# Patient Record
Sex: FEMALE | Race: ASIAN | NOT HISPANIC OR LATINO
[De-identification: names, ages, dates, MRNs, and addresses within clinical notes are randomized per-mention and may not be internally consistent; named-entity substitution may affect disease eponyms.]

---

## 2017-04-25 ENCOUNTER — APPOINTMENT (OUTPATIENT)
Dept: NEUROSURGERY | Facility: CLINIC | Age: 62
End: 2017-04-25

## 2017-04-25 VITALS
HEART RATE: 81 BPM | SYSTOLIC BLOOD PRESSURE: 114 MMHG | HEIGHT: 62 IN | BODY MASS INDEX: 21.16 KG/M2 | WEIGHT: 115 LBS | DIASTOLIC BLOOD PRESSURE: 79 MMHG

## 2017-05-02 ENCOUNTER — OTHER (OUTPATIENT)
Age: 62
End: 2017-05-02

## 2017-05-09 ENCOUNTER — APPOINTMENT (OUTPATIENT)
Dept: OBGYN | Facility: CLINIC | Age: 62
End: 2017-05-09

## 2017-05-22 ENCOUNTER — APPOINTMENT (OUTPATIENT)
Dept: OBGYN | Facility: CLINIC | Age: 62
End: 2017-05-22

## 2017-06-02 ENCOUNTER — APPOINTMENT (OUTPATIENT)
Dept: OBGYN | Facility: CLINIC | Age: 62
End: 2017-06-02

## 2017-06-05 ENCOUNTER — MESSAGE (OUTPATIENT)
Age: 62
End: 2017-06-05

## 2017-06-06 ENCOUNTER — APPOINTMENT (OUTPATIENT)
Dept: NEUROSURGERY | Facility: CLINIC | Age: 62
End: 2017-06-06

## 2017-06-06 VITALS
HEIGHT: 62 IN | WEIGHT: 104 LBS | BODY MASS INDEX: 19.14 KG/M2 | SYSTOLIC BLOOD PRESSURE: 111 MMHG | DIASTOLIC BLOOD PRESSURE: 74 MMHG | HEART RATE: 70 BPM

## 2017-06-06 DIAGNOSIS — M54.5 LOW BACK PAIN: ICD-10-CM

## 2017-06-06 DIAGNOSIS — M54.16 RADICULOPATHY, LUMBAR REGION: ICD-10-CM

## 2017-06-06 DIAGNOSIS — M51.26 OTHER INTERVERTEBRAL DISC DISPLACEMENT, LUMBAR REGION: ICD-10-CM

## 2017-06-22 ENCOUNTER — APPOINTMENT (OUTPATIENT)
Dept: NEUROSURGERY | Facility: CLINIC | Age: 62
End: 2017-06-22

## 2017-11-02 ENCOUNTER — APPOINTMENT (OUTPATIENT)
Dept: CARDIOLOGY | Facility: CLINIC | Age: 62
End: 2017-11-02

## 2017-11-02 ENCOUNTER — APPOINTMENT (OUTPATIENT)
Dept: OPHTHALMOLOGY | Facility: CLINIC | Age: 62
End: 2017-11-02

## 2017-11-28 ENCOUNTER — INPATIENT (INPATIENT)
Facility: HOSPITAL | Age: 62
LOS: 0 days | Discharge: HOME | DRG: 312 | End: 2017-11-29
Attending: INTERNAL MEDICINE | Admitting: INTERNAL MEDICINE
Payer: COMMERCIAL

## 2017-11-28 VITALS — RESPIRATION RATE: 20 BRPM | HEART RATE: 70 BPM | DIASTOLIC BLOOD PRESSURE: 90 MMHG | SYSTOLIC BLOOD PRESSURE: 150 MMHG
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
## 2017-11-28 DIAGNOSIS — Z90.49 ACQUIRED ABSENCE OF OTHER SPECIFIED PARTS OF DIGESTIVE TRACT: Chronic | ICD-10-CM

## 2017-11-28 LAB
ALBUMIN SERPL ELPH-MCNC: 4.3 G/DL — SIGNIFICANT CHANGE UP (ref 3.3–5)
ALP SERPL-CCNC: 54 U/L — SIGNIFICANT CHANGE UP (ref 40–120)
ALT FLD-CCNC: 28 U/L RC — SIGNIFICANT CHANGE UP (ref 10–45)
ANION GAP SERPL CALC-SCNC: 19 MMOL/L — HIGH (ref 5–17)
AST SERPL-CCNC: 23 U/L — SIGNIFICANT CHANGE UP (ref 10–40)
BASE EXCESS BLDV CALC-SCNC: 1.6 MMOL/L — SIGNIFICANT CHANGE UP (ref -2–2)
BASOPHILS # BLD AUTO: 0.1 K/UL — SIGNIFICANT CHANGE UP (ref 0–0.2)
BASOPHILS NFR BLD AUTO: 1 % — SIGNIFICANT CHANGE UP (ref 0–2)
BILIRUB SERPL-MCNC: 0.5 MG/DL — SIGNIFICANT CHANGE UP (ref 0.2–1.2)
BUN SERPL-MCNC: 13 MG/DL — SIGNIFICANT CHANGE UP (ref 7–23)
CA-I SERPL-SCNC: 1.23 MMOL/L — SIGNIFICANT CHANGE UP (ref 1.12–1.3)
CALCIUM SERPL-MCNC: 10 MG/DL — SIGNIFICANT CHANGE UP (ref 8.4–10.5)
CHLORIDE BLDV-SCNC: 105 MMOL/L — SIGNIFICANT CHANGE UP (ref 96–108)
CHLORIDE SERPL-SCNC: 100 MMOL/L — SIGNIFICANT CHANGE UP (ref 96–108)
CK MB CFR SERPL CALC: 1.2 NG/ML — SIGNIFICANT CHANGE UP (ref 0–3.8)
CO2 BLDV-SCNC: 27 MMOL/L — SIGNIFICANT CHANGE UP (ref 22–30)
CO2 SERPL-SCNC: 22 MMOL/L — SIGNIFICANT CHANGE UP (ref 22–31)
CREAT SERPL-MCNC: 0.57 MG/DL — SIGNIFICANT CHANGE UP (ref 0.5–1.3)
EOSINOPHIL # BLD AUTO: 0.2 K/UL — SIGNIFICANT CHANGE UP (ref 0–0.5)
EOSINOPHIL NFR BLD AUTO: 2.7 % — SIGNIFICANT CHANGE UP (ref 0–6)
GAS PNL BLDV: 139 MMOL/L — SIGNIFICANT CHANGE UP (ref 136–145)
GAS PNL BLDV: SIGNIFICANT CHANGE UP
GAS PNL BLDV: SIGNIFICANT CHANGE UP
GLUCOSE BLDV-MCNC: 122 MG/DL — HIGH (ref 70–99)
GLUCOSE SERPL-MCNC: 130 MG/DL — HIGH (ref 70–99)
HCO3 BLDV-SCNC: 26 MMOL/L — SIGNIFICANT CHANGE UP (ref 21–29)
HCT VFR BLD CALC: 42.9 % — SIGNIFICANT CHANGE UP (ref 34.5–45)
HCT VFR BLDA CALC: 42 % — SIGNIFICANT CHANGE UP (ref 39–50)
HGB BLD CALC-MCNC: 13.7 G/DL — SIGNIFICANT CHANGE UP (ref 11.5–15.5)
HGB BLD-MCNC: 14 G/DL — SIGNIFICANT CHANGE UP (ref 11.5–15.5)
LACTATE BLDV-MCNC: 5.1 MMOL/L — CRITICAL HIGH (ref 0.7–2)
LYMPHOCYTES # BLD AUTO: 3.4 K/UL — HIGH (ref 1–3.3)
LYMPHOCYTES # BLD AUTO: 42.9 % — SIGNIFICANT CHANGE UP (ref 13–44)
MCHC RBC-ENTMCNC: 29.5 PG — SIGNIFICANT CHANGE UP (ref 27–34)
MCHC RBC-ENTMCNC: 32.6 GM/DL — SIGNIFICANT CHANGE UP (ref 32–36)
MCV RBC AUTO: 90.4 FL — SIGNIFICANT CHANGE UP (ref 80–100)
MONOCYTES # BLD AUTO: 0.7 K/UL — SIGNIFICANT CHANGE UP (ref 0–0.9)
MONOCYTES NFR BLD AUTO: 8.2 % — SIGNIFICANT CHANGE UP (ref 2–14)
NEUTROPHILS # BLD AUTO: 3.6 K/UL — SIGNIFICANT CHANGE UP (ref 1.8–7.4)
NEUTROPHILS NFR BLD AUTO: 45.2 % — SIGNIFICANT CHANGE UP (ref 43–77)
PCO2 BLDV: 40 MMHG — SIGNIFICANT CHANGE UP (ref 35–50)
PH BLDV: 7.42 — SIGNIFICANT CHANGE UP (ref 7.35–7.45)
PLATELET # BLD AUTO: 341 K/UL — SIGNIFICANT CHANGE UP (ref 150–400)
PO2 BLDV: 29 MMHG — SIGNIFICANT CHANGE UP (ref 25–45)
POTASSIUM BLDV-SCNC: 4.4 MMOL/L — SIGNIFICANT CHANGE UP (ref 3.5–5)
POTASSIUM SERPL-MCNC: 5 MMOL/L — SIGNIFICANT CHANGE UP (ref 3.5–5.3)
POTASSIUM SERPL-SCNC: 5 MMOL/L — SIGNIFICANT CHANGE UP (ref 3.5–5.3)
PROT SERPL-MCNC: 6.7 G/DL — SIGNIFICANT CHANGE UP (ref 6–8.3)
RBC # BLD: 4.75 M/UL — SIGNIFICANT CHANGE UP (ref 3.8–5.2)
RBC # FLD: 11.8 % — SIGNIFICANT CHANGE UP (ref 10.3–14.5)
SAO2 % BLDV: 52 % — LOW (ref 67–88)
SODIUM SERPL-SCNC: 141 MMOL/L — SIGNIFICANT CHANGE UP (ref 135–145)
TROPONIN T SERPL-MCNC: <0.01 NG/ML — SIGNIFICANT CHANGE UP (ref 0–0.06)
TROPONIN T SERPL-MCNC: <0.01 NG/ML — SIGNIFICANT CHANGE UP (ref 0–0.06)
WBC # BLD: 8 K/UL — SIGNIFICANT CHANGE UP (ref 3.8–10.5)
WBC # FLD AUTO: 8 K/UL — SIGNIFICANT CHANGE UP (ref 3.8–10.5)

## 2017-11-28 PROCEDURE — 99285 EMERGENCY DEPT VISIT HI MDM: CPT

## 2017-11-28 PROCEDURE — 70450 CT HEAD/BRAIN W/O DYE: CPT | Mod: 26

## 2017-11-28 PROCEDURE — 71020: CPT | Mod: 26

## 2017-11-28 RX ORDER — SODIUM CHLORIDE 9 MG/ML
1000 INJECTION INTRAMUSCULAR; INTRAVENOUS; SUBCUTANEOUS
Qty: 0 | Refills: 0 | Status: DISCONTINUED | OUTPATIENT
Start: 2017-11-28 | End: 2017-11-29

## 2017-11-28 RX ORDER — SODIUM CHLORIDE 9 MG/ML
3 INJECTION INTRAMUSCULAR; INTRAVENOUS; SUBCUTANEOUS ONCE
Qty: 0 | Refills: 0 | Status: COMPLETED | OUTPATIENT
Start: 2017-11-28 | End: 2017-11-28

## 2017-11-28 RX ADMIN — SODIUM CHLORIDE 125 MILLILITER(S): 9 INJECTION INTRAMUSCULAR; INTRAVENOUS; SUBCUTANEOUS at 14:59

## 2017-11-28 RX ADMIN — SODIUM CHLORIDE 3 MILLILITER(S): 9 INJECTION INTRAMUSCULAR; INTRAVENOUS; SUBCUTANEOUS at 14:50

## 2017-11-28 NOTE — ED PROVIDER NOTE - PHYSICAL EXAMINATION
GENERAL: THIN, awake, alert, oriented to person, place, time/situation and in NAD  ENMT: DRY MM, Airway patent, Nasal mucosa clear. Throat has no vesicles, no oropharyngeal exudates and uvula is midline.  EYES: Clear bilaterally, PERRL  CARDIAC: Regular rate, regular rhythm.  Heart sounds S1, S2, no S3, S4. No murmurs, rubs or gallops. no pedal edema bilat  RESPIRATORY: Breath sounds clear and equal in bilateral anterior lung fields, no wheezes/ronchi/stridor; pt breathing and speaking comfortably with no increased WOB, no accessory mm. use, no intercostal retractions, no nasal flaring  GI: no ecchymosis, erythema, or lacerations, abdomen soft, non-distended, non-tender, no rebound or guarding.   NEURO: pupils 3 mm, PERRL, facial sensation intact to light touch in all 3 divisions bilat (CN V), face is symmetric with normal eye closure, eye opening, and smile (CN VII), hearing is normal to rubbing fingers (CN VII), phonation is normal (CN IX, X),  shoulder shrug intact bilat (CN XI), tongue is midline with nl movements and no atrophy (CN XII), finger to nose test nl bilat, negative pronator drift bilat, negative Romberg, speech is clear; 5/5 motor strength BUE and BLE: deltoids, biceps, triceps, wrist flexors/extensors, hand , hip flexors, knee flexors/extensors, plantar/dorsiflexors, hallux flexors/extensors; sensation intact to light touch BUE and BLE: C5-T1 and L3-S1

## 2017-11-28 NOTE — ED ADULT NURSE NOTE - ED STAT RN HANDOFF DETAILS
hand off given to oncoming RN Isaura Vargas. Pt awaiting CT. A&Ox3. Head pressure almost resolved. Fall risk precautions maintained. son at stretcherside. Yellow band intact. Red socks applied. siderails up

## 2017-11-28 NOTE — ED PROVIDER NOTE - ATTENDING CONTRIBUTION TO CARE
Private Physician Dr BARRY fierro pcp /not staff  62y female pmh Osteoporosis, DMT2, HTN,HLD, Sp erich 7/2017, Allergy pcn/ciprp/sulfa. Pt comes to ed complains of was cooking and carrying a pot felt dizzy and fell and with head pressure. With nausea vomiting, and a weird feeling thoughtout her body. Onset at home. PT under stress. Marital infedelity. PE WDWN looking stated age NCAT chest  clear anterior & posterior abd soft +bs neuro no focal defects power 5/5 all extr pain light touch intact.  Tom Shahid MD, Facep

## 2017-11-28 NOTE — ED PROVIDER NOTE - MEDICAL DECISION MAKING DETAILS
61 yo Estonian speaking F c PMH of HTN, HLD, and DM presenting s/p fall after hearing "bad news". No hx of sx. Pt denies head trauma or LOC. On exam, /90 VS otherwise WNL. On exam, no focal neuro deficits, pt appears thin, dry MM. Likely vasovagal syncope vs orthostatic. IVF, CTH, CXR, EKG, and labs pending. will reassess.

## 2017-11-28 NOTE — H&P ADULT - NSHPPHYSICALEXAM_GEN_ALL_CORE
Vital Signs Last 24 Hrs  T(C): 36.8 (28 Nov 2017 20:52), Max: 36.9 (28 Nov 2017 19:43)  T(F): 98.3 (28 Nov 2017 20:52), Max: 98.4 (28 Nov 2017 19:43)  HR: 75 (28 Nov 2017 20:52) (68 - 82)  BP: 119/80 (28 Nov 2017 20:52) (115/65 - 150/90)  BP(mean): --  RR: 16 (28 Nov 2017 20:52) (16 - 20)  SpO2: 98% (28 Nov 2017 20:52) (98% - 100%)    PHYSICAL EXAM:  GENERAL: NAD, well-developed  HEAD:  Atraumatic, Normocephalic  EYES: EOMI, PERRLA, conjunctiva and sclera clear  NECK: Supple, No JVD  CHEST/LUNG: Clear to auscultation bilaterally; No wheeze  HEART: Regular rate and rhythm; No murmurs, rubs, or gallops  ABDOMEN: Soft, Nontender, Nondistended; Bowel sounds present  EXTREMITIES:  2+ Peripheral Pulses, No clubbing, cyanosis, or edema  PSYCH: AAOx3  NEUROLOGY: non-focal, moves all ext  SKIN: No rashes or lesions

## 2017-11-28 NOTE — ED ADULT NURSE NOTE - OBJECTIVE STATEMENT
[id number] 62 yr old female brought to ER via ambulance on stretcher for further eval and tx of head pressure/pain. "Under a lot of stress recently"Has been anxious and crying x 3 days. denies SI/HI. Interpretation through son. denies dizziness, palp, chest pain, SOB, fever, chills or dysuria. Tripped and feel today while carrying a tray of food. no LOC

## 2017-11-28 NOTE — H&P ADULT - HISTORY OF PRESENT ILLNESS
pt seen and examined on 11/28/17    63 yo F c PMH of HTN, HLD, and DM presenting s/p fall. Pt reports she heard upsetting news about her , then was carrying a pot in her kitchen, then fell to the ground, she does not know why she fell. Pt denies head trauma or LOC. Pt denies preceding dizziness or lightheadedness, states after fall she felt generalized weakness. Pt denies hx of sx. Pt states she is eating and drinking normally.     	ROS positive: fall, generalized weakness  ROS negative: f/c, CP, SOB, n/v, abdominal pain, hemoptysis, hematachezia, visual changes    pt doesn't know home meds

## 2017-11-28 NOTE — ED PROVIDER NOTE - PROGRESS NOTE DETAILS
Endorsed to Dr Jaqui Shahid MD, Facep pt states she feels better s/p IVF. pt states she feels better s/p IVF. I discussed with patient that we would like her to stay in the ED overnight for monitoring of her heart she states she would like to defer this decision to her son and daughter. spoke to son in person and daughter over the phone. daughter would like to wait to make a decision until pending labs are resulted. will reassess lactate improving, pt and family amenable to admission to hospital, called hospitalist [phone], awaiting callback

## 2017-11-28 NOTE — H&P ADULT - NSHPREVIEWOFSYSTEMS_GEN_ALL_CORE
Constitutional: No fever or weight loss.  Skin: No rash.  Eyes: No recent vision problems or eye pain.  ENT: No congestion, ear pain, or sore throat.  Endocrine: No thyroid problems.  Cardiovascular: No chest pain or palpation.  Respiratory: No cough, shortness of breath, congestion, or wheezing.  Gastrointestinal: No abdominal pain, nausea, vomiting, or diarrhea.  Genitourinary: No dysuria.  Musculoskeletal: No joint swelling.  Neurologic: No headache.

## 2017-11-28 NOTE — ED PROVIDER NOTE - OBJECTIVE STATEMENT
ID [id number] Pt gives permission for her son to intepret from Irish to English, declines     61 yo Irish speaking F c PMH of HTN, HLD, and DM presenting s/p fall. Pt reports she heard upsetting news about her , then was carrying a pot in her kitchen, then fell to the ground, she does not know why she fell. Pt denies head trauma or LOC. Pt denies preceding dizziness or lightheadedness, states after fall she felt generalized weakness. Pt denies hx of sx. Pt states she is eating and drinking normally.     ROS positive: fall, generalized weakness  ROS negative: f/c, CP, SOB, n/v, abdominal pain, hemoptysis, hematachezia, visual changes

## 2017-11-28 NOTE — H&P ADULT - ASSESSMENT
62 female h/o dm, htn, chol, a/w syncope/fall    syncope  cards consult  echo  orthostatics  tele  trend CE      DM  iss    clarify home meds in am    dvt ppx

## 2017-11-28 NOTE — ED ADULT NURSE REASSESSMENT NOTE - NS ED NURSE REASSESS COMMENT FT1
Report taken from Portia PEREZ in red. Pt resting in stretcher with family at bedside currently being examined by MD. Pt noted to be able to stand and ambulate with steady gait. Pt moved into room to have EKG and cardiac monitor applied. IV fluids infusing as ordered. Labs sent.

## 2017-11-28 NOTE — ED ADULT NURSE REASSESSMENT NOTE - NS ED NURSE REASSESS COMMENT FT1
Pt resting in stretcher with family at bedside in NAD and VSS. Repeat VBG drawn as per orders. Md at bedside speaking to pt and family.

## 2017-11-29 ENCOUNTER — TRANSCRIPTION ENCOUNTER (OUTPATIENT)
Age: 62
End: 2017-11-29

## 2017-11-29 VITALS — SYSTOLIC BLOOD PRESSURE: 117 MMHG | DIASTOLIC BLOOD PRESSURE: 76 MMHG | HEART RATE: 76 BPM

## 2017-11-29 LAB
ANION GAP SERPL CALC-SCNC: 12 MMOL/L — SIGNIFICANT CHANGE UP (ref 5–17)
BUN SERPL-MCNC: 10 MG/DL — SIGNIFICANT CHANGE UP (ref 7–23)
CALCIUM SERPL-MCNC: 8.4 MG/DL — SIGNIFICANT CHANGE UP (ref 8.4–10.5)
CHLORIDE SERPL-SCNC: 107 MMOL/L — SIGNIFICANT CHANGE UP (ref 96–108)
CK SERPL-CCNC: 33 U/L — SIGNIFICANT CHANGE UP (ref 25–170)
CO2 SERPL-SCNC: 23 MMOL/L — SIGNIFICANT CHANGE UP (ref 22–31)
CREAT SERPL-MCNC: 0.48 MG/DL — LOW (ref 0.5–1.3)
GLUCOSE BLDC GLUCOMTR-MCNC: 109 MG/DL — HIGH (ref 70–99)
GLUCOSE BLDC GLUCOMTR-MCNC: 172 MG/DL — HIGH (ref 70–99)
GLUCOSE BLDC GLUCOMTR-MCNC: 216 MG/DL — HIGH (ref 70–99)
GLUCOSE SERPL-MCNC: 215 MG/DL — HIGH (ref 70–99)
HBA1C BLD-MCNC: 6.9 % — HIGH (ref 4–5.6)
HCT VFR BLD CALC: 34.5 % — SIGNIFICANT CHANGE UP (ref 34.5–45)
HGB BLD-MCNC: 11.8 G/DL — SIGNIFICANT CHANGE UP (ref 11.5–15.5)
MCHC RBC-ENTMCNC: 30.8 PG — SIGNIFICANT CHANGE UP (ref 27–34)
MCHC RBC-ENTMCNC: 34.1 GM/DL — SIGNIFICANT CHANGE UP (ref 32–36)
MCV RBC AUTO: 90.3 FL — SIGNIFICANT CHANGE UP (ref 80–100)
PLATELET # BLD AUTO: 278 K/UL — SIGNIFICANT CHANGE UP (ref 150–400)
POTASSIUM SERPL-MCNC: 3.6 MMOL/L — SIGNIFICANT CHANGE UP (ref 3.5–5.3)
POTASSIUM SERPL-SCNC: 3.6 MMOL/L — SIGNIFICANT CHANGE UP (ref 3.5–5.3)
RBC # BLD: 3.82 M/UL — SIGNIFICANT CHANGE UP (ref 3.8–5.2)
RBC # FLD: 11.9 % — SIGNIFICANT CHANGE UP (ref 10.3–14.5)
SODIUM SERPL-SCNC: 142 MMOL/L — SIGNIFICANT CHANGE UP (ref 135–145)
TROPONIN T SERPL-MCNC: <0.01 NG/ML — SIGNIFICANT CHANGE UP (ref 0–0.06)
WBC # BLD: 6.4 K/UL — SIGNIFICANT CHANGE UP (ref 3.8–10.5)
WBC # FLD AUTO: 6.4 K/UL — SIGNIFICANT CHANGE UP (ref 3.8–10.5)

## 2017-11-29 PROCEDURE — 84132 ASSAY OF SERUM POTASSIUM: CPT

## 2017-11-29 PROCEDURE — 84484 ASSAY OF TROPONIN QUANT: CPT

## 2017-11-29 PROCEDURE — 83036 HEMOGLOBIN GLYCOSYLATED A1C: CPT

## 2017-11-29 PROCEDURE — 83605 ASSAY OF LACTIC ACID: CPT

## 2017-11-29 PROCEDURE — 82435 ASSAY OF BLOOD CHLORIDE: CPT

## 2017-11-29 PROCEDURE — 82803 BLOOD GASES ANY COMBINATION: CPT

## 2017-11-29 PROCEDURE — 82565 ASSAY OF CREATININE: CPT

## 2017-11-29 PROCEDURE — 70450 CT HEAD/BRAIN W/O DYE: CPT

## 2017-11-29 PROCEDURE — 82330 ASSAY OF CALCIUM: CPT

## 2017-11-29 PROCEDURE — 71046 X-RAY EXAM CHEST 2 VIEWS: CPT

## 2017-11-29 PROCEDURE — 80053 COMPREHEN METABOLIC PANEL: CPT

## 2017-11-29 PROCEDURE — 82550 ASSAY OF CK (CPK): CPT

## 2017-11-29 PROCEDURE — 99285 EMERGENCY DEPT VISIT HI MDM: CPT | Mod: 25

## 2017-11-29 PROCEDURE — 80048 BASIC METABOLIC PNL TOTAL CA: CPT

## 2017-11-29 PROCEDURE — 82553 CREATINE MB FRACTION: CPT

## 2017-11-29 PROCEDURE — 85014 HEMATOCRIT: CPT

## 2017-11-29 PROCEDURE — 84295 ASSAY OF SERUM SODIUM: CPT

## 2017-11-29 PROCEDURE — 82947 ASSAY GLUCOSE BLOOD QUANT: CPT

## 2017-11-29 PROCEDURE — 93306 TTE W/DOPPLER COMPLETE: CPT

## 2017-11-29 PROCEDURE — 82962 GLUCOSE BLOOD TEST: CPT

## 2017-11-29 PROCEDURE — 85027 COMPLETE CBC AUTOMATED: CPT

## 2017-11-29 PROCEDURE — 93306 TTE W/DOPPLER COMPLETE: CPT | Mod: 26

## 2017-11-29 RX ORDER — DEXTROSE 50 % IN WATER 50 %
25 SYRINGE (ML) INTRAVENOUS ONCE
Qty: 0 | Refills: 0 | Status: DISCONTINUED | OUTPATIENT
Start: 2017-11-29 | End: 2017-11-29

## 2017-11-29 RX ORDER — HEPARIN SODIUM 5000 [USP'U]/ML
5000 INJECTION INTRAVENOUS; SUBCUTANEOUS EVERY 8 HOURS
Qty: 0 | Refills: 0 | Status: DISCONTINUED | OUTPATIENT
Start: 2017-11-29 | End: 2017-11-29

## 2017-11-29 RX ORDER — ATORVASTATIN CALCIUM 80 MG/1
10 TABLET, FILM COATED ORAL AT BEDTIME
Qty: 0 | Refills: 0 | Status: DISCONTINUED | OUTPATIENT
Start: 2017-11-29 | End: 2017-11-29

## 2017-11-29 RX ORDER — DEXTROSE 50 % IN WATER 50 %
12.5 SYRINGE (ML) INTRAVENOUS ONCE
Qty: 0 | Refills: 0 | Status: DISCONTINUED | OUTPATIENT
Start: 2017-11-29 | End: 2017-11-29

## 2017-11-29 RX ORDER — SODIUM CHLORIDE 9 MG/ML
1000 INJECTION, SOLUTION INTRAVENOUS
Qty: 0 | Refills: 0 | Status: DISCONTINUED | OUTPATIENT
Start: 2017-11-29 | End: 2017-11-29

## 2017-11-29 RX ORDER — INSULIN LISPRO 100/ML
VIAL (ML) SUBCUTANEOUS
Qty: 0 | Refills: 0 | Status: DISCONTINUED | OUTPATIENT
Start: 2017-11-29 | End: 2017-11-29

## 2017-11-29 RX ORDER — LISINOPRIL 2.5 MG/1
1 TABLET ORAL
Qty: 0 | Refills: 0 | COMMUNITY

## 2017-11-29 RX ORDER — DOCUSATE SODIUM 100 MG
100 CAPSULE ORAL THREE TIMES A DAY
Qty: 0 | Refills: 0 | Status: DISCONTINUED | OUTPATIENT
Start: 2017-11-29 | End: 2017-11-29

## 2017-11-29 RX ORDER — DEXTROSE 50 % IN WATER 50 %
1 SYRINGE (ML) INTRAVENOUS ONCE
Qty: 0 | Refills: 0 | Status: DISCONTINUED | OUTPATIENT
Start: 2017-11-29 | End: 2017-11-29

## 2017-11-29 RX ORDER — GLUCAGON INJECTION, SOLUTION 0.5 MG/.1ML
1 INJECTION, SOLUTION SUBCUTANEOUS ONCE
Qty: 0 | Refills: 0 | Status: DISCONTINUED | OUTPATIENT
Start: 2017-11-29 | End: 2017-11-29

## 2017-11-29 RX ORDER — CARVEDILOL PHOSPHATE 80 MG/1
12.5 CAPSULE, EXTENDED RELEASE ORAL EVERY 12 HOURS
Qty: 0 | Refills: 0 | Status: DISCONTINUED | OUTPATIENT
Start: 2017-11-29 | End: 2017-11-29

## 2017-11-29 RX ORDER — ASPIRIN/CALCIUM CARB/MAGNESIUM 324 MG
81 TABLET ORAL DAILY
Qty: 0 | Refills: 0 | Status: DISCONTINUED | OUTPATIENT
Start: 2017-11-29 | End: 2017-11-29

## 2017-11-29 RX ORDER — LISINOPRIL 2.5 MG/1
20 TABLET ORAL DAILY
Qty: 0 | Refills: 0 | Status: DISCONTINUED | OUTPATIENT
Start: 2017-11-29 | End: 2017-11-29

## 2017-11-29 RX ORDER — DOCUSATE SODIUM 100 MG
1 CAPSULE ORAL
Qty: 0 | Refills: 0 | COMMUNITY
Start: 2017-11-29

## 2017-11-29 RX ORDER — PANTOPRAZOLE SODIUM 20 MG/1
40 TABLET, DELAYED RELEASE ORAL
Qty: 0 | Refills: 0 | Status: DISCONTINUED | OUTPATIENT
Start: 2017-11-29 | End: 2017-11-29

## 2017-11-29 RX ORDER — ASPIRIN/CALCIUM CARB/MAGNESIUM 324 MG
1 TABLET ORAL
Qty: 0 | Refills: 0 | COMMUNITY

## 2017-11-29 RX ADMIN — HEPARIN SODIUM 5000 UNIT(S): 5000 INJECTION INTRAVENOUS; SUBCUTANEOUS at 13:11

## 2017-11-29 RX ADMIN — Medication 81 MILLIGRAM(S): at 18:05

## 2017-11-29 RX ADMIN — CARVEDILOL PHOSPHATE 12.5 MILLIGRAM(S): 80 CAPSULE, EXTENDED RELEASE ORAL at 18:05

## 2017-11-29 RX ADMIN — Medication 1: at 17:58

## 2017-11-29 RX ADMIN — Medication 2: at 12:46

## 2017-11-29 RX ADMIN — HEPARIN SODIUM 5000 UNIT(S): 5000 INJECTION INTRAVENOUS; SUBCUTANEOUS at 05:09

## 2017-11-29 RX ADMIN — Medication 1 TABLET(S): at 18:05

## 2017-11-29 NOTE — PROGRESS NOTE ADULT - ASSESSMENT
62 female h/o dm, htn, chol, a/w syncope/fall    syncope  cards consult noted  echo noted  orthostatics nl  tele no ectopy  acs ruled out  likely vasovagal syncope      DM  resume home meds    dc planning  oupt f/u with pmd

## 2017-11-29 NOTE — CONSULT NOTE ADULT - SUBJECTIVE AND OBJECTIVE BOX
CHIEF COMPLAINT:    HPI:  63 yo F c PMH of HTN, HLD, and DM presenting s/p fall. Pt reports she heard upsetting news about her , then was carrying a pot in her kitchen, then fell to the ground, she does not know why she fell. Pt denies head trauma or LOC. Pt denies preceding chest pain, dyspnea, palpitations,dizziness or lightheadedness, states after fall she felt generalized weakness. Pt denies hx of sx. Pt states she is eating and drinking normally.     	    PAST MEDICAL & SURGICAL HISTORY:  High cholesterol  Hypertension  Diabetes  DM (diabetes mellitus)  History of cholecystectomy          PREVIOUS DIAGNOSTIC TESTING:    [ ] Echocardiogram:  [ ]  Catheterization:  [ ] Stress Test:  	    MEDICATIONS:  MEDICATIONS  (STANDING):  dextrose 5%. 1000 milliLiter(s) (50 mL/Hr) IV Continuous <Continuous>  dextrose 50% Injectable 12.5 Gram(s) IV Push once  dextrose 50% Injectable 25 Gram(s) IV Push once  dextrose 50% Injectable 25 Gram(s) IV Push once  heparin  Injectable 5000 Unit(s) SubCutaneous every 8 hours  insulin lispro (HumaLOG) corrective regimen sliding scale   SubCutaneous three times a day before meals  sodium chloride 0.9%. 1000 milliLiter(s) (125 mL/Hr) IV Continuous <Continuous>      FAMILY HISTORY:  No pertinent family history in first degree relatives      SOCIAL HISTORY:    [ ] Non-smoker  [ ] Smoker  [ ] Alcohol    Allergies    Cipro (Unknown)  pcn cipro sulfa (Unknown)  penicillin (Hives)  penicillin (Unknown)  sulfa drugs (Unknown)    Intolerances    	    REVIEW OF SYSTEMS:  CONSTITUTIONAL: No fever, weight loss, or fatigue  EYES: No eye pain, visual disturbances, or discharge  ENMT:  No difficulty hearing, tinnitus, vertigo; No sinus or throat pain  NECK: No pain or stiffness  RESPIRATORY: see HPI  CARDIOVASCULAR: No chest pain, palpitations, passing out, dizziness, or leg swelling  GASTROINTESTINAL: No abdominal or epigastric pain. No nausea, vomiting, or hematemesis; No diarrhea or constipation. No melena or hematochezia.  GENITOURINARY: No dysuria, frequency, hematuria, or incontinence  NEUROLOGICAL: No headaches, memory loss, loss of strength, numbness, or tremors  SKIN: No itching, burning, rashes, or lesions   	    [ ] All others negative	  [ ] Unable to obtain    PHYSICAL EXAM:  T(C): 36.8 (11-29-17 @ 05:19), Max: 36.9 (11-28-17 @ 19:43)  HR: 86 (11-29-17 @ 05:19) (68 - 86)  BP: 102/62 (11-29-17 @ 05:19) (102/62 - 150/90)  RR: 18 (11-29-17 @ 05:19) (16 - 20)  SpO2: 97% (11-29-17 @ 05:19) (97% - 100%)  Wt(kg): --  I&O's Summary    28 Nov 2017 07:01  -  29 Nov 2017 07:00  --------------------------------------------------------  IN: 240 mL / OUT: 0 mL / NET: 240 mL        Appearance: Normal	  Psychiatry: A & O x 3, Mood & affect appropriate  HEENT:   Normal oral mucosa, PERRL, EOMI	  Lymphatic: No lymphadenopathy  Cardiovascular: Normal S1 S2,RRR, No JVD, No murmurs  Respiratory: Lungs clear to auscultation	  Gastrointestinal:  Soft, Non-tender, + BS	  Skin: No rashes, No ecchymoses, No cyanosis	  Neurologic: Non-focal  Extremities: Normal range of motion, No clubbing, cyanosis or edema  Vascular: Peripheral pulses palpable 2+ bilaterally    TELEMETRY: 	  NSR  ECG:  	NSR, anteroseptal; TWI  RADIOLOGY:  OTHER: 	  	  LABS:	 	    CARDIAC MARKERS:                                  11.8   6.4   )-----------( 278      ( 29 Nov 2017 06:17 )             34.5     11-29    142  |  107  |  10  ----------------------------<  215<H>  3.6   |  23  |  0.48<L>    Ca    8.4      29 Nov 2017 06:17    TPro  6.7  /  Alb  4.3  /  TBili  0.5  /  DBili  x   /  AST  23  /  ALT  28  /  AlkPhos  54  11-28      proBNP:   Lipid Profile:   HgA1c: Hemoglobin A1C, Whole Blood: 6.9 % (11-29 @ 07:19)    TSH:     ASSESSMENT/PLAN:

## 2017-11-29 NOTE — DISCHARGE NOTE ADULT - MEDICATION SUMMARY - MEDICATIONS TO TAKE
I will START or STAY ON the medications listed below when I get home from the hospital:    meloxicam 15 mg oral tablet  -- 1 tab(s) by mouth once a day, As Needed  -- Indication: For Muscle spasm    aspirin 81 mg oral tablet  -- 1 tab(s) by mouth once a day  -- Indication: For Cardiac prophylaxis    lisinopril 20 mg oral tablet  -- 1 tab(s) by mouth once a day  -- Indication: For High BP    Tradjenta 5 mg oral tablet  -- 1 tab(s) by mouth once a day  -- Indication: For DM2    metFORMIN 1000 mg oral tablet  -- 1 tab(s) by mouth 2 times a day  -- Indication: For DM2    ondansetron 4 mg oral tablet  -- 1 tab(s) by mouth every 12 hours, As Needed  -- Indication: For Nausea/vomiting    pravastatin 40 mg oral tablet  -- 1 tab(s) by mouth once a day  -- Indication: For High cholesterol    carvedilol 12.5 mg oral tablet  -- 1 tab(s) by mouth 2 times a day  -- Indication: For High BP    docusate sodium 100 mg oral capsule  -- 1 cap(s) by mouth 2 times a day, As Needed  -- Indication: For constipation    NexIUM 20 mg oral delayed release capsule  -- 1 cap(s) by mouth once a day  -- Indication: For Reflux/Indigestion    Oyster Shell Calcium with Vitamin D 500 mg-200 intl units oral tablet  -- 1 tab(s) by mouth 2 times a day  -- Indication: For supplement

## 2017-11-29 NOTE — DISCHARGE NOTE ADULT - PATIENT PORTAL LINK FT
“You can access the [employer] Patient Portal, offered by [facility], by registering with the following website: [email or web address]”

## 2017-11-29 NOTE — DISCHARGE NOTE ADULT - CARE PLAN
Principal Discharge DX:	Fall, initial encounter  Goal:	Maintain safety  Instructions for follow-up, activity and diet:	No fractures found on CT scan of your head and neck  Cardiology work-up was negative for any abnormalities that may have contributed to your fall.  Follow up with your primary healthcare provider in 1-2 weeks.  Call for appointment.  Secondary Diagnosis:	DM (diabetes mellitus)  Instructions for follow-up, activity and diet:	HgA1C this admission 6.9  Make sure you get your HgA1c checked every three months.  If you take oral diabetes medications, check your blood glucose two times a day.  If you take insulin, check your blood glucose before meals and at bedtime.  It's important not to skip any meals.  Keep a log of your blood glucose results and always take it with you to your doctor appointments.  Keep a list of your current medications including injectables and over the counter medications and bring this medication list with you to all your doctor appointments.  If you have not seen your ophthalmologist this year call for appointment.  Check your feet daily for redness, sores, or openings. Do not self treat. If no improvement in two days call your primary care physician for an appointment.  Secondary Diagnosis:	Hypertension  Instructions for follow-up, activity and diet:	Low salt diet  Activity as tolerated.  Take all medication as prescribed.  Follow up with your medical doctor for routine blood pressure monitoring at your next visit.  Notify your doctor if you have any of the following symptoms:   Dizziness, Lightheadedness, Blurry vision, Headache, Chest pain, Shortness of breath

## 2017-11-29 NOTE — PROGRESS NOTE ADULT - SUBJECTIVE AND OBJECTIVE BOX
JOHNNY JIMÉNEZ  62y Female  MRN:[id number]    Patient is a 62y old  Female who presents with a chief complaint of fall/syncope (28 Nov 2017 23:50)    HPI:  63 yo F c PMH of HTN, HLD, and DM presenting s/p fall. Pt reports she heard upsetting news about her , then was carrying a pot in her kitchen, then fell to the ground, she does not know why she fell. Pt denies head trauma or LOC. Pt denies preceding dizziness or lightheadedness, states after fall she felt generalized weakness. Pt denies hx of sx. Pt states she is eating and drinking normally.     	ROS positive: fall, generalized weakness  ROS negative: f/c, CP, SOB, n/v, abdominal pain, hemoptysis, hematachezia, visual changes    pt doesn't know home meds (28 Nov 2017 23:50)      Patient seen and evaluated at bedside. No acute events overnight except as noted.    Interval HPI: feels well. no c/o    PAST MEDICAL & SURGICAL HISTORY:  High cholesterol  Hypertension  Diabetes  DM (diabetes mellitus)  History of cholecystectomy      REVIEW OF SYSTEMS:  Constitutional: No fever, chills, fatigue or weight loss.  Skin: No rash.  Eyes: No recent vision problems or eye pain.  ENT: No congestion, ear pain, or sore throat.  Endocrine: No thyroid problems.  Cardiovascular: No chest pain or palpation.  Respiratory: No cough, shortness of breath, congestion, or wheezing.  Gastrointestinal: No abdominal pain, nausea, vomiting, or diarrhea.  Genitourinary: No dysuria.  Musculoskeletal: No joint swelling.  Neurologic: No headache.    VITALS:  Vital Signs Last 24 Hrs  T(C): 36.7 (29 Nov 2017 13:39), Max: 36.9 (28 Nov 2017 19:43)  T(F): 98.1 (29 Nov 2017 13:39), Max: 98.4 (28 Nov 2017 19:43)  HR: 69 (29 Nov 2017 13:39) (69 - 86)  BP: 120/66 (29 Nov 2017 13:39) (102/62 - 137/89)  BP(mean): --  RR: 18 (29 Nov 2017 13:39) (16 - 18)  SpO2: 98% (29 Nov 2017 13:39) (97% - 100%)  CAPILLARY BLOOD GLUCOSE      POCT Blood Glucose.: 216 mg/dL (29 Nov 2017 12:34)  POCT Blood Glucose.: 109 mg/dL (29 Nov 2017 08:07)    I&O's Summary    28 Nov 2017 07:01  -  29 Nov 2017 07:00  --------------------------------------------------------  IN: 240 mL / OUT: 0 mL / NET: 240 mL    29 Nov 2017 07:01  -  29 Nov 2017 16:34  --------------------------------------------------------  IN: 240 mL / OUT: 0 mL / NET: 240 mL        PHYSICAL EXAM:  GENERAL: NAD, well-developed  HEAD:  Atraumatic, Normocephalic  EYES: EOMI, PERRLA, conjunctiva and sclera clear  NECK: Supple, No JVD  CHEST/LUNG: Clear to auscultation bilaterally; No wheeze  HEART: S1, S2; No murmurs, rubs, or gallops  ABDOMEN: Soft, Nontender, Nondistended; Bowel sounds present  EXTREMITIES:  2+ Peripheral Pulses, No clubbing, cyanosis, or edema  PSYCH: Normal affect  NEUROLOGY: AAOX3; non-focal  SKIN: No rashes or lesions    Consultant(s) Notes Reviewed:  [x ] YES  [ ] NO  Care Discussed with Consultants/Other Providers [ x] YES  [ ] NO    MEDS:  MEDICATIONS  (STANDING):  aspirin  chewable 81 milliGRAM(s) Oral daily  atorvastatin 10 milliGRAM(s) Oral at bedtime  calcium carbonate 1250 mG + Vitamin D (OsCal 500 + D) 1 Tablet(s) Oral two times a day  carvedilol 12.5 milliGRAM(s) Oral every 12 hours  dextrose 5%. 1000 milliLiter(s) (50 mL/Hr) IV Continuous <Continuous>  dextrose 50% Injectable 12.5 Gram(s) IV Push once  dextrose 50% Injectable 25 Gram(s) IV Push once  dextrose 50% Injectable 25 Gram(s) IV Push once  docusate sodium 100 milliGRAM(s) Oral three times a day  heparin  Injectable 5000 Unit(s) SubCutaneous every 8 hours  insulin lispro (HumaLOG) corrective regimen sliding scale   SubCutaneous three times a day before meals  lisinopril 20 milliGRAM(s) Oral daily  pantoprazole    Tablet 40 milliGRAM(s) Oral before breakfast  sodium chloride 0.9%. 1000 milliLiter(s) (125 mL/Hr) IV Continuous <Continuous>    MEDICATIONS  (PRN):  dextrose Gel 1 Dose(s) Oral once PRN Blood Glucose LESS THAN 70 milliGRAM(s)/deciliter  glucagon  Injectable 1 milliGRAM(s) IntraMuscular once PRN Glucose LESS THAN 70 milligrams/deciliter    ALLERGIES:  Cipro (Unknown)  pcn cipro sulfa (Unknown)  penicillin (Hives)  penicillin (Unknown)  sulfa drugs (Unknown)      LABS:                        11.8   6.4   )-----------( 278      ( 29 Nov 2017 06:17 )             34.5     11-29    142  |  107  |  10  ----------------------------<  215<H>  3.6   |  23  |  0.48<L>    Ca    8.4      29 Nov 2017 06:17    TPro  6.7  /  Alb  4.3  /  TBili  0.5  /  DBili  x   /  AST  23  /  ALT  28  /  AlkPhos  54  11-28      CARDIAC MARKERS ( 29 Nov 2017 06:17 )  x     / <0.01 ng/mL / 33 U/L / x     / x      CARDIAC MARKERS ( 28 Nov 2017 19:53 )  x     / <0.01 ng/mL / x     / x     / 1.2 ng/mL  CARDIAC MARKERS ( 28 Nov 2017 14:45 )  x     / <0.01 ng/mL / x     / x     / x          LIVER FUNCTIONS - ( 28 Nov 2017 14:45 )  Alb: 4.3 g/dL / Pro: 6.7 g/dL / ALK PHOS: 54 U/L / ALT: 28 U/L RC / AST: 23 U/L / GGT: x             TSH:   A1c:Hemoglobin A1C, Whole Blood: 6.9 % (11-29 @ 07:19)    < from: Transthoracic Echocardiogram (11.29.17 @ 08:01) >  -------------------  Conclusions:  1. Normal left ventricular internal dimensions and wall  thicknesses.  2. Normal left ventricular systolic function.  3. Mild diastolic dysfunction (Stage I).    < end of copied text >

## 2017-11-29 NOTE — CONSULT NOTE ADULT - ASSESSMENT
62 year old woman with PMH as above presenting with possible syncope    -presentation likely vagally mediated  -orthostatics negative  -no tele events  -f/u ECHO  -if echo unremarkable I have no objection to DC

## 2017-11-29 NOTE — DISCHARGE NOTE ADULT - PLAN OF CARE
Maintain safety No fractures found on CT scan of your head and neck  Cardiology work-up was negative for any abnormalities that may have contributed to your fall.  Follow up with your primary healthcare provider in 1-2 weeks.  Call for appointment. HgA1C this admission 6.9  Make sure you get your HgA1c checked every three months.  If you take oral diabetes medications, check your blood glucose two times a day.  If you take insulin, check your blood glucose before meals and at bedtime.  It's important not to skip any meals.  Keep a log of your blood glucose results and always take it with you to your doctor appointments.  Keep a list of your current medications including injectables and over the counter medications and bring this medication list with you to all your doctor appointments.  If you have not seen your ophthalmologist this year call for appointment.  Check your feet daily for redness, sores, or openings. Do not self treat. If no improvement in two days call your primary care physician for an appointment. Low salt diet  Activity as tolerated.  Take all medication as prescribed.  Follow up with your medical doctor for routine blood pressure monitoring at your next visit.  Notify your doctor if you have any of the following symptoms:   Dizziness, Lightheadedness, Blurry vision, Headache, Chest pain, Shortness of breath

## 2017-11-29 NOTE — DISCHARGE NOTE ADULT - HOSPITAL COURSE
61 yo F PMH of HTN, HLD, and DM presenting s/p fall. Pt reports she heard upsetting news about her , then was carrying a pot in her kitchen, then fell to the ground, she does not know why she fell. Pt denies head trauma or LOC. Pt denies preceding dizziness or lightheadedness, states after fall she felt generalized weakness. Pt denies hx of sx. Pt states she is eating and drinking normally.     CTH negative; ACS ruled out with neg enzymes and normal TTE.  Cardiology consulted and cleared for discharge.

## 2018-12-02 ENCOUNTER — LABORATORY RESULT (OUTPATIENT)
Age: 63
End: 2018-12-02

## 2018-12-03 ENCOUNTER — APPOINTMENT (OUTPATIENT)
Dept: OBGYN | Facility: CLINIC | Age: 63
End: 2018-12-03
Payer: SELF-PAY

## 2018-12-03 ENCOUNTER — OUTPATIENT (OUTPATIENT)
Dept: OUTPATIENT SERVICES | Facility: HOSPITAL | Age: 63
LOS: 1 days | End: 2018-12-03
Payer: MEDICAID

## 2018-12-03 VITALS — WEIGHT: 104 LBS | DIASTOLIC BLOOD PRESSURE: 80 MMHG | SYSTOLIC BLOOD PRESSURE: 130 MMHG | BODY MASS INDEX: 19.02 KG/M2

## 2018-12-03 DIAGNOSIS — Z01.419 ENCOUNTER FOR GYNECOLOGICAL EXAMINATION (GENERAL) (ROUTINE) W/OUT ABNORMAL FINDINGS: ICD-10-CM

## 2018-12-03 DIAGNOSIS — E10.641 TYPE 1 DIABETES MELLITUS WITH HYPOGLYCEMIA WITH COMA: ICD-10-CM

## 2018-12-03 DIAGNOSIS — Z00.00 ENCOUNTER FOR GENERAL ADULT MEDICAL EXAMINATION W/OUT ABNORMAL FINDINGS: ICD-10-CM

## 2018-12-03 DIAGNOSIS — Z90.49 ACQUIRED ABSENCE OF OTHER SPECIFIED PARTS OF DIGESTIVE TRACT: Chronic | ICD-10-CM

## 2018-12-03 DIAGNOSIS — N76.0 ACUTE VAGINITIS: ICD-10-CM

## 2018-12-03 PROCEDURE — 88175 CYTOPATH C/V AUTO FLUID REDO: CPT

## 2018-12-03 PROCEDURE — 81003 URINALYSIS AUTO W/O SCOPE: CPT

## 2018-12-03 PROCEDURE — G0463: CPT

## 2018-12-03 PROCEDURE — 87591 N.GONORRHOEAE DNA AMP PROB: CPT

## 2018-12-03 PROCEDURE — 87624 HPV HI-RISK TYP POOLED RSLT: CPT

## 2018-12-03 PROCEDURE — 99386 PREV VISIT NEW AGE 40-64: CPT | Mod: NC

## 2018-12-03 PROCEDURE — 87491 CHLMYD TRACH DNA AMP PROBE: CPT

## 2018-12-03 PROCEDURE — 81003 URINALYSIS AUTO W/O SCOPE: CPT | Mod: NC,QW

## 2018-12-04 DIAGNOSIS — Z01.419 ENCOUNTER FOR GYNECOLOGICAL EXAMINATION (GENERAL) (ROUTINE) WITHOUT ABNORMAL FINDINGS: ICD-10-CM

## 2018-12-04 LAB — HPV HIGH+LOW RISK DNA PNL CVX: SIGNIFICANT CHANGE UP

## 2018-12-09 ENCOUNTER — FORM ENCOUNTER (OUTPATIENT)
Age: 63
End: 2018-12-09

## 2018-12-10 ENCOUNTER — OUTPATIENT (OUTPATIENT)
Dept: OUTPATIENT SERVICES | Facility: HOSPITAL | Age: 63
LOS: 1 days | End: 2018-12-10
Payer: MEDICAID

## 2018-12-10 ENCOUNTER — APPOINTMENT (OUTPATIENT)
Dept: MAMMOGRAPHY | Facility: IMAGING CENTER | Age: 63
End: 2018-12-10
Payer: MEDICAID

## 2018-12-10 DIAGNOSIS — Z90.49 ACQUIRED ABSENCE OF OTHER SPECIFIED PARTS OF DIGESTIVE TRACT: Chronic | ICD-10-CM

## 2018-12-10 DIAGNOSIS — Z00.00 ENCOUNTER FOR GENERAL ADULT MEDICAL EXAMINATION WITHOUT ABNORMAL FINDINGS: ICD-10-CM

## 2018-12-10 PROCEDURE — 77067 SCR MAMMO BI INCL CAD: CPT

## 2018-12-10 PROCEDURE — 77063 BREAST TOMOSYNTHESIS BI: CPT

## 2018-12-10 PROCEDURE — 77067 SCR MAMMO BI INCL CAD: CPT | Mod: 26

## 2018-12-10 PROCEDURE — 77063 BREAST TOMOSYNTHESIS BI: CPT | Mod: 26

## 2019-01-07 ENCOUNTER — APPOINTMENT (OUTPATIENT)
Dept: OPHTHALMOLOGY | Facility: CLINIC | Age: 64
End: 2019-01-07
Payer: MEDICAID

## 2019-01-07 PROCEDURE — 92014 COMPRE OPH EXAM EST PT 1/>: CPT

## 2019-01-07 PROCEDURE — 92134 CPTRZ OPH DX IMG PST SGM RTA: CPT

## 2019-01-15 ENCOUNTER — APPOINTMENT (OUTPATIENT)
Dept: OPHTHALMOLOGY | Facility: CLINIC | Age: 64
End: 2019-01-15

## 2020-02-05 ENCOUNTER — APPOINTMENT (OUTPATIENT)
Dept: ENDOCRINOLOGY | Facility: CLINIC | Age: 65
End: 2020-02-05

## 2020-02-26 ENCOUNTER — NON-APPOINTMENT (OUTPATIENT)
Age: 65
End: 2020-02-26

## 2020-05-08 NOTE — H&P ADULT - NSHPRISKHIVSCREEN_GEN_ALL_CORE
fingers/toes warm to touch/capillary refill time < 2 seconds
Not applicable (known HIV negative status in last year)

## 2020-05-18 ENCOUNTER — APPOINTMENT (OUTPATIENT)
Dept: ENDOCRINOLOGY | Facility: CLINIC | Age: 65
End: 2020-05-18

## 2020-05-21 ENCOUNTER — APPOINTMENT (OUTPATIENT)
Dept: ENDOCRINOLOGY | Facility: CLINIC | Age: 65
End: 2020-05-21

## 2020-12-16 PROBLEM — Z01.419 ENCOUNTER FOR CERVICAL PAP SMEAR WITH PELVIC EXAM: Status: RESOLVED | Noted: 2018-12-03 | Resolved: 2020-12-16

## 2021-01-18 NOTE — PATIENT PROFILE ADULT. - [facility]
Health Maintenance Due   Topic Date Due   • Hepatitis B Vaccine (1 of 3 - Risk 3-dose series) 12/05/1986   • Shingles Vaccine (1 of 2) 12/05/2017   • Diabetes Eye Exam  06/11/2020   • Influenza Vaccine (1) 09/01/2020       Patient is due for topics as listed above but is not proceeding with Immunization(s) Hep B, Influenza and Shingles at this time.     Patient will get his diabetic eye exam with [facility].      unsure

## 2021-12-06 ENCOUNTER — INPATIENT (INPATIENT)
Facility: HOSPITAL | Age: 66
LOS: 3 days | Discharge: HOME HEALTH | End: 2021-12-10
Attending: INTERNAL MEDICINE | Admitting: INTERNAL MEDICINE
Payer: MEDICAID

## 2021-12-06 VITALS
RESPIRATION RATE: 16 BRPM | SYSTOLIC BLOOD PRESSURE: 158 MMHG | TEMPERATURE: 98 F | DIASTOLIC BLOOD PRESSURE: 91 MMHG | HEART RATE: 63 BPM | OXYGEN SATURATION: 100 % | HEIGHT: 65 IN

## 2021-12-06 DIAGNOSIS — R06.00 DYSPNEA, UNSPECIFIED: ICD-10-CM

## 2021-12-06 DIAGNOSIS — I10 ESSENTIAL (PRIMARY) HYPERTENSION: ICD-10-CM

## 2021-12-06 DIAGNOSIS — R07.9 CHEST PAIN, UNSPECIFIED: ICD-10-CM

## 2021-12-06 DIAGNOSIS — E11.65 TYPE 2 DIABETES MELLITUS WITH HYPERGLYCEMIA: ICD-10-CM

## 2021-12-06 DIAGNOSIS — R94.31 ABNORMAL ELECTROCARDIOGRAM [ECG] [EKG]: ICD-10-CM

## 2021-12-06 DIAGNOSIS — Z29.9 ENCOUNTER FOR PROPHYLACTIC MEASURES, UNSPECIFIED: ICD-10-CM

## 2021-12-06 DIAGNOSIS — Z90.49 ACQUIRED ABSENCE OF OTHER SPECIFIED PARTS OF DIGESTIVE TRACT: Chronic | ICD-10-CM

## 2021-12-06 LAB
24R-OH-CALCIDIOL SERPL-MCNC: 55 NG/ML — SIGNIFICANT CHANGE UP (ref 30–80)
A1C WITH ESTIMATED AVERAGE GLUCOSE RESULT: 10.9 % — HIGH (ref 4–5.6)
ALBUMIN SERPL ELPH-MCNC: 4.2 G/DL — SIGNIFICANT CHANGE UP (ref 3.3–5)
ALP SERPL-CCNC: 94 U/L — SIGNIFICANT CHANGE UP (ref 40–120)
ALT FLD-CCNC: 31 U/L — SIGNIFICANT CHANGE UP (ref 4–33)
ANION GAP SERPL CALC-SCNC: 10 MMOL/L — SIGNIFICANT CHANGE UP (ref 7–14)
ANION GAP SERPL CALC-SCNC: 11 MMOL/L — SIGNIFICANT CHANGE UP (ref 7–14)
APTT BLD: 33.1 SEC — SIGNIFICANT CHANGE UP (ref 27–36.3)
AST SERPL-CCNC: 21 U/L — SIGNIFICANT CHANGE UP (ref 4–32)
BASOPHILS # BLD AUTO: 0.12 K/UL — SIGNIFICANT CHANGE UP (ref 0–0.2)
BASOPHILS NFR BLD AUTO: 1.7 % — SIGNIFICANT CHANGE UP (ref 0–2)
BILIRUB SERPL-MCNC: 0.5 MG/DL — SIGNIFICANT CHANGE UP (ref 0.2–1.2)
BUN SERPL-MCNC: 14 MG/DL — SIGNIFICANT CHANGE UP (ref 7–23)
BUN SERPL-MCNC: 17 MG/DL — SIGNIFICANT CHANGE UP (ref 7–23)
CALCIUM SERPL-MCNC: 9.1 MG/DL — SIGNIFICANT CHANGE UP (ref 8.4–10.5)
CALCIUM SERPL-MCNC: 9.8 MG/DL — SIGNIFICANT CHANGE UP (ref 8.4–10.5)
CHLORIDE SERPL-SCNC: 102 MMOL/L — SIGNIFICANT CHANGE UP (ref 98–107)
CHLORIDE SERPL-SCNC: 106 MMOL/L — SIGNIFICANT CHANGE UP (ref 98–107)
CHOLEST SERPL-MCNC: 138 MG/DL — SIGNIFICANT CHANGE UP
CO2 SERPL-SCNC: 23 MMOL/L — SIGNIFICANT CHANGE UP (ref 22–31)
CO2 SERPL-SCNC: 23 MMOL/L — SIGNIFICANT CHANGE UP (ref 22–31)
CREAT SERPL-MCNC: 0.46 MG/DL — LOW (ref 0.5–1.3)
CREAT SERPL-MCNC: 0.46 MG/DL — LOW (ref 0.5–1.3)
EOSINOPHIL # BLD AUTO: 0.27 K/UL — SIGNIFICANT CHANGE UP (ref 0–0.5)
EOSINOPHIL NFR BLD AUTO: 3.8 % — SIGNIFICANT CHANGE UP (ref 0–6)
ESTIMATED AVERAGE GLUCOSE: 266 — SIGNIFICANT CHANGE UP
GLUCOSE SERPL-MCNC: 202 MG/DL — HIGH (ref 70–99)
GLUCOSE SERPL-MCNC: 266 MG/DL — HIGH (ref 70–99)
HCT VFR BLD CALC: 42.4 % — SIGNIFICANT CHANGE UP (ref 34.5–45)
HCYS SERPL-MCNC: 5.4 UMOL/L — SIGNIFICANT CHANGE UP
HDLC SERPL-MCNC: 55 MG/DL — SIGNIFICANT CHANGE UP
HGB BLD-MCNC: 14.2 G/DL — SIGNIFICANT CHANGE UP (ref 11.5–15.5)
IANC: 2.74 K/UL — SIGNIFICANT CHANGE UP (ref 1.5–8.5)
IMM GRANULOCYTES NFR BLD AUTO: 0.1 % — SIGNIFICANT CHANGE UP (ref 0–1.5)
INR BLD: 1.03 RATIO — SIGNIFICANT CHANGE UP (ref 0.88–1.16)
LIPID PNL WITH DIRECT LDL SERPL: 71 MG/DL — SIGNIFICANT CHANGE UP
LYMPHOCYTES # BLD AUTO: 3.36 K/UL — HIGH (ref 1–3.3)
LYMPHOCYTES # BLD AUTO: 46.7 % — HIGH (ref 13–44)
MAGNESIUM SERPL-MCNC: 2.1 MG/DL — SIGNIFICANT CHANGE UP (ref 1.6–2.6)
MCHC RBC-ENTMCNC: 29.5 PG — SIGNIFICANT CHANGE UP (ref 27–34)
MCHC RBC-ENTMCNC: 33.5 GM/DL — SIGNIFICANT CHANGE UP (ref 32–36)
MCV RBC AUTO: 88 FL — SIGNIFICANT CHANGE UP (ref 80–100)
MONOCYTES # BLD AUTO: 0.7 K/UL — SIGNIFICANT CHANGE UP (ref 0–0.9)
MONOCYTES NFR BLD AUTO: 9.7 % — SIGNIFICANT CHANGE UP (ref 2–14)
NEUTROPHILS # BLD AUTO: 2.74 K/UL — SIGNIFICANT CHANGE UP (ref 1.8–7.4)
NEUTROPHILS NFR BLD AUTO: 38 % — LOW (ref 43–77)
NON HDL CHOLESTEROL: 83 MG/DL — SIGNIFICANT CHANGE UP
NRBC # BLD: 0 /100 WBCS — SIGNIFICANT CHANGE UP
NRBC # FLD: 0 K/UL — SIGNIFICANT CHANGE UP
PHOSPHATE SERPL-MCNC: 4.3 MG/DL — SIGNIFICANT CHANGE UP (ref 2.5–4.5)
PLATELET # BLD AUTO: 280 K/UL — SIGNIFICANT CHANGE UP (ref 150–400)
POTASSIUM SERPL-MCNC: 4 MMOL/L — SIGNIFICANT CHANGE UP (ref 3.5–5.3)
POTASSIUM SERPL-MCNC: 4.3 MMOL/L — SIGNIFICANT CHANGE UP (ref 3.5–5.3)
POTASSIUM SERPL-SCNC: 4 MMOL/L — SIGNIFICANT CHANGE UP (ref 3.5–5.3)
POTASSIUM SERPL-SCNC: 4.3 MMOL/L — SIGNIFICANT CHANGE UP (ref 3.5–5.3)
PROT SERPL-MCNC: 6.7 G/DL — SIGNIFICANT CHANGE UP (ref 6–8.3)
PROTHROM AB SERPL-ACNC: 11.8 SEC — SIGNIFICANT CHANGE UP (ref 10.6–13.6)
RBC # BLD: 4.82 M/UL — SIGNIFICANT CHANGE UP (ref 3.8–5.2)
RBC # FLD: 13.2 % — SIGNIFICANT CHANGE UP (ref 10.3–14.5)
SARS-COV-2 RNA SPEC QL NAA+PROBE: SIGNIFICANT CHANGE UP
SODIUM SERPL-SCNC: 136 MMOL/L — SIGNIFICANT CHANGE UP (ref 135–145)
SODIUM SERPL-SCNC: 139 MMOL/L — SIGNIFICANT CHANGE UP (ref 135–145)
TRIGL SERPL-MCNC: 59 MG/DL — SIGNIFICANT CHANGE UP
TROPONIN T, HIGH SENSITIVITY RESULT: 7 NG/L — SIGNIFICANT CHANGE UP
TROPONIN T, HIGH SENSITIVITY RESULT: 7 NG/L — SIGNIFICANT CHANGE UP
TROPONIN T, HIGH SENSITIVITY RESULT: <6 NG/L — SIGNIFICANT CHANGE UP
TSH SERPL-MCNC: 3.43 UIU/ML — SIGNIFICANT CHANGE UP (ref 0.27–4.2)
WBC # BLD: 7.2 K/UL — SIGNIFICANT CHANGE UP (ref 3.8–10.5)
WBC # FLD AUTO: 7.2 K/UL — SIGNIFICANT CHANGE UP (ref 3.8–10.5)

## 2021-12-06 PROCEDURE — 71045 X-RAY EXAM CHEST 1 VIEW: CPT | Mod: 26

## 2021-12-06 PROCEDURE — 99285 EMERGENCY DEPT VISIT HI MDM: CPT | Mod: 25

## 2021-12-06 PROCEDURE — 99223 1ST HOSP IP/OBS HIGH 75: CPT

## 2021-12-06 PROCEDURE — 93010 ELECTROCARDIOGRAM REPORT: CPT

## 2021-12-06 RX ORDER — INSULIN LISPRO 100/ML
VIAL (ML) SUBCUTANEOUS AT BEDTIME
Refills: 0 | Status: DISCONTINUED | OUTPATIENT
Start: 2021-12-06 | End: 2021-12-10

## 2021-12-06 RX ORDER — ASPIRIN/CALCIUM CARB/MAGNESIUM 324 MG
324 TABLET ORAL ONCE
Refills: 0 | Status: COMPLETED | OUTPATIENT
Start: 2021-12-06 | End: 2021-12-06

## 2021-12-06 RX ORDER — INSULIN GLARGINE 100 [IU]/ML
15 INJECTION, SOLUTION SUBCUTANEOUS AT BEDTIME
Refills: 0 | Status: DISCONTINUED | OUTPATIENT
Start: 2021-12-06 | End: 2021-12-06

## 2021-12-06 RX ORDER — ESOMEPRAZOLE MAGNESIUM 40 MG/1
1 CAPSULE, DELAYED RELEASE ORAL
Qty: 0 | Refills: 0 | DISCHARGE

## 2021-12-06 RX ORDER — GLUCAGON INJECTION, SOLUTION 0.5 MG/.1ML
1 INJECTION, SOLUTION SUBCUTANEOUS ONCE
Refills: 0 | Status: DISCONTINUED | OUTPATIENT
Start: 2021-12-06 | End: 2021-12-10

## 2021-12-06 RX ORDER — MELOXICAM 15 MG/1
1 TABLET ORAL
Qty: 0 | Refills: 0 | DISCHARGE

## 2021-12-06 RX ORDER — ATORVASTATIN CALCIUM 80 MG/1
40 TABLET, FILM COATED ORAL AT BEDTIME
Refills: 0 | Status: DISCONTINUED | OUTPATIENT
Start: 2021-12-06 | End: 2021-12-08

## 2021-12-06 RX ORDER — MAGNESIUM HYDROXIDE 400 MG/1
30 TABLET, CHEWABLE ORAL DAILY
Refills: 0 | Status: DISCONTINUED | OUTPATIENT
Start: 2021-12-06 | End: 2021-12-10

## 2021-12-06 RX ORDER — DEXTROSE 50 % IN WATER 50 %
12.5 SYRINGE (ML) INTRAVENOUS ONCE
Refills: 0 | Status: DISCONTINUED | OUTPATIENT
Start: 2021-12-06 | End: 2021-12-10

## 2021-12-06 RX ORDER — ASPIRIN/CALCIUM CARB/MAGNESIUM 324 MG
81 TABLET ORAL DAILY
Refills: 0 | Status: DISCONTINUED | OUTPATIENT
Start: 2021-12-06 | End: 2021-12-10

## 2021-12-06 RX ORDER — LANOLIN ALCOHOL/MO/W.PET/CERES
3 CREAM (GRAM) TOPICAL AT BEDTIME
Refills: 0 | Status: DISCONTINUED | OUTPATIENT
Start: 2021-12-06 | End: 2021-12-10

## 2021-12-06 RX ORDER — DEXTROSE 50 % IN WATER 50 %
15 SYRINGE (ML) INTRAVENOUS ONCE
Refills: 0 | Status: DISCONTINUED | OUTPATIENT
Start: 2021-12-06 | End: 2021-12-10

## 2021-12-06 RX ORDER — LISINOPRIL 2.5 MG/1
1 TABLET ORAL
Qty: 0 | Refills: 0 | DISCHARGE

## 2021-12-06 RX ORDER — ENOXAPARIN SODIUM 100 MG/ML
40 INJECTION SUBCUTANEOUS AT BEDTIME
Refills: 0 | Status: DISCONTINUED | OUTPATIENT
Start: 2021-12-06 | End: 2021-12-10

## 2021-12-06 RX ORDER — ONDANSETRON 8 MG/1
1 TABLET, FILM COATED ORAL
Qty: 0 | Refills: 0 | DISCHARGE

## 2021-12-06 RX ORDER — SODIUM CHLORIDE 9 MG/ML
1000 INJECTION, SOLUTION INTRAVENOUS
Refills: 0 | Status: DISCONTINUED | OUTPATIENT
Start: 2021-12-06 | End: 2021-12-10

## 2021-12-06 RX ORDER — INSULIN LISPRO 100/ML
3 VIAL (ML) SUBCUTANEOUS
Refills: 0 | Status: DISCONTINUED | OUTPATIENT
Start: 2021-12-06 | End: 2021-12-07

## 2021-12-06 RX ORDER — ACETAMINOPHEN 500 MG
650 TABLET ORAL EVERY 6 HOURS
Refills: 0 | Status: DISCONTINUED | OUTPATIENT
Start: 2021-12-06 | End: 2021-12-10

## 2021-12-06 RX ORDER — SODIUM CHLORIDE 9 MG/ML
500 INJECTION, SOLUTION INTRAVENOUS ONCE
Refills: 0 | Status: COMPLETED | OUTPATIENT
Start: 2021-12-06 | End: 2021-12-06

## 2021-12-06 RX ORDER — DEXTROSE 50 % IN WATER 50 %
25 SYRINGE (ML) INTRAVENOUS ONCE
Refills: 0 | Status: DISCONTINUED | OUTPATIENT
Start: 2021-12-06 | End: 2021-12-10

## 2021-12-06 RX ORDER — INSULIN LISPRO 100/ML
VIAL (ML) SUBCUTANEOUS
Refills: 0 | Status: DISCONTINUED | OUTPATIENT
Start: 2021-12-06 | End: 2021-12-10

## 2021-12-06 RX ORDER — LINAGLIPTIN 5 MG/1
1 TABLET, FILM COATED ORAL
Qty: 0 | Refills: 0 | DISCHARGE

## 2021-12-06 RX ORDER — INSULIN GLARGINE 100 [IU]/ML
15 INJECTION, SOLUTION SUBCUTANEOUS AT BEDTIME
Refills: 0 | Status: DISCONTINUED | OUTPATIENT
Start: 2021-12-06 | End: 2021-12-08

## 2021-12-06 RX ORDER — METFORMIN HYDROCHLORIDE 850 MG/1
1 TABLET ORAL
Qty: 0 | Refills: 0 | DISCHARGE

## 2021-12-06 RX ORDER — CARVEDILOL PHOSPHATE 80 MG/1
12.5 CAPSULE, EXTENDED RELEASE ORAL EVERY 12 HOURS
Refills: 0 | Status: DISCONTINUED | OUTPATIENT
Start: 2021-12-06 | End: 2021-12-10

## 2021-12-06 RX ORDER — LOSARTAN POTASSIUM 100 MG/1
50 TABLET, FILM COATED ORAL DAILY
Refills: 0 | Status: DISCONTINUED | OUTPATIENT
Start: 2021-12-06 | End: 2021-12-08

## 2021-12-06 RX ADMIN — Medication 1 TABLET(S): at 11:43

## 2021-12-06 RX ADMIN — Medication 6: at 17:35

## 2021-12-06 RX ADMIN — ATORVASTATIN CALCIUM 40 MILLIGRAM(S): 80 TABLET, FILM COATED ORAL at 22:35

## 2021-12-06 RX ADMIN — ENOXAPARIN SODIUM 40 MILLIGRAM(S): 100 INJECTION SUBCUTANEOUS at 22:38

## 2021-12-06 RX ADMIN — Medication 81 MILLIGRAM(S): at 11:43

## 2021-12-06 RX ADMIN — Medication 4: at 11:43

## 2021-12-06 RX ADMIN — CARVEDILOL PHOSPHATE 12.5 MILLIGRAM(S): 80 CAPSULE, EXTENDED RELEASE ORAL at 17:37

## 2021-12-06 RX ADMIN — LOSARTAN POTASSIUM 50 MILLIGRAM(S): 100 TABLET, FILM COATED ORAL at 11:43

## 2021-12-06 RX ADMIN — SODIUM CHLORIDE 100 MILLILITER(S): 9 INJECTION, SOLUTION INTRAVENOUS at 11:15

## 2021-12-06 RX ADMIN — Medication 324 MILLIGRAM(S): at 01:36

## 2021-12-06 RX ADMIN — Medication 3 UNIT(S): at 17:36

## 2021-12-06 RX ADMIN — Medication 1 TABLET(S): at 17:36

## 2021-12-06 RX ADMIN — SODIUM CHLORIDE 1000 MILLILITER(S): 9 INJECTION, SOLUTION INTRAVENOUS at 10:30

## 2021-12-06 RX ADMIN — INSULIN GLARGINE 15 UNIT(S): 100 INJECTION, SOLUTION SUBCUTANEOUS at 22:38

## 2021-12-06 NOTE — H&P ADULT - HISTORY OF PRESENT ILLNESS
66 year old female, with past history significant for Hypertension, Type-2 DM (started on insulin 2 weeks ago), Dyslipidemia and Osteoporosis, presented to the ED secondary to persistent chest pain.  Seen and evaluated at bedside; appears worried, but in NAD.  Reports uncontrolled DM requiring initiation of insulin (Lantus) approximately 2 weeks ago; initially started at 10 units, increased to 15 and is now at 10 units HS because of uncontrolled values.  Chest pain and associated signs/symptoms began after insulin prescription.  Notes chest pain involving the bilateral chest and ?midsternal area, associated with elevated blood pressure, dizziness, bitemporal headaches (sometimes global headaches) and a "whooshing" sounds at times.  Chest pain reportedly intermittent for the past ~ one week, but was of longer duration today than usual; lasted approximately 4 hours and was at the midsternal area.  Blood pressure has been periodically elevated and PMD increased Losartan from 50 mg daily to 50 mg BID, but remains uncontrolled.  BP was elevated today ('s to 200).  Elevated BP is associated with chest pain, palpitations, shortness of breath, dizziness, headaches (as noted above).  Patient complains of FLORES; daughter states that even with patient talking on the telephone for 10 to 15 minutes causes shortness of breath.  Dyspnea on minimal exertion.  This is noted since insulin therapy was started.  Patient has been to 3 different doctors (including PMD and Cardiologist - Dr Mendoza); told by PMD that ECG was abnormal and subsequently saw cardiologist.    Vital signs upon ED presentation as follows: BP = 158/91, HR = 63, RR = 16, T = 36.7 C (98 F), O2 Sat = 100% on RA.  Diagnosed with Chest Pain.  Prescribed aspirin 324 mg  in the ED.

## 2021-12-06 NOTE — PATIENT PROFILE ADULT - HISTORY OF COVID-19 VACCINATION
Hi Dr. Maya please see below.  Pharmacist Romy states accu-check and one touch are covered by insurance.  Orders for one touch meter and supplies pended, please review and sign if okay.  Thank you,  Leslie VIDAL     Yes

## 2021-12-06 NOTE — H&P ADULT - PROBLEM SELECTOR PROBLEM 1
HISTORY OF PRESENT ILLNESS    Chief Complaint   Patient presents with    Hypertension    Annual Wellness Visit       Presents for follow-up    RLS - Taking gabapentin 300 mg at 10 am, 100 mg at night,   AVERY - can not tolerate CPAP  Insomnia - prior auth for Laura Hinojosa was sent. Taking 5 mg for years. No alternatives tried. Takes zoloft    Reports loose stools which cause difficulty wiping. Thick stool, glue-like. Reports some fecal leakage. He cut back coffee and tea and it may have helped some. No recent fecal incontinence. Fiber intake     Having bladder cath with Dr. Nicole Diaz next month. Review of Systems   All other systems reviewed and are negative, except as noted in HPI    Past Medical and Surgical History   has a past medical history of Advanced care planning/counseling discussion (10/8/2015); Basal cell carcinoma, face; Bladder cancer ([address] (2012); BPH with obstruction/lower urinary tract symptoms; Chronic lower back pain; Chronic neck pain; Depression, major; Dupuytren's contracture of left hand; Epistaxis (2013); Fecal incontinence; Hearing loss; HTN (hypertension); Insomnia; IVCD (intraventricular conduction defect) (11/30/2015); Long term current use of anticoagulants with INR goal of 2.0-3.0; Loose stools (7/18/2014); Paroxysmal atrial fibrillation ([address] Peripheral neuropathy (HCC); RLS (restless legs syndrome) (7/3/2017); SCC (squamous cell carcinoma), face; and Urothelial carcinoma of right distal ureter ([address] (5/1/15). has a past surgical history that includes bladder repair (2012); retinal detachment repair (Right); hernia repair; colonoscopy (2012); cystourethroscopy (9/2014); cystourethroscopy (5/1/15); and cystourethroscopy (2/29/16). reports that he has quit smoking. He has never used smokeless tobacco. He reports that he drinks alcohol.  family history is not on file. Physical Exam   Nursing note and vitals reviewed.   Blood pressure 109/69, pulse 66, temperature 98.1 °F (36.7 °C), temperature source Oral, resp. rate 12, height 5' 9\" (1.753 m), weight 220 lb (99.8 kg), SpO2 94 %. Constitutional:  No distress. Eyes: Conjunctivae are normal.   Ears:  Hearing grossly intact  Cardiovascular: Normal rate. regular rhythm, no murmurs or gallops  No edema  Pulmonary/Chest: Effort normal.   CTAB  Musculoskeletal: moves all 4 extremities  Moderate left external hemorrhoid   Neurological: Alert and oriented to person, place, and time. Skin: No rash noted. Psychiatric: Normal mood and affect. Behavior is normal.     ASSESSMENT and Guichonadra Reynolds was seen today for hypertension and annual wellness visit. Diagnoses and all orders for this visit:    RLS (restless legs syndrome) - s/s are borderline controlled. Will adjust gabapentin as below as tolerated    Essential hypertension - Controlled on current regimen. Continue current medications as written in chart. Insomnia, unspecified type - varnu try trazodone to replace ambien. Consider rozerem. Avoid doxepin due to cardiac dz  -     traZODone (DESYREL) 50 mg tablet; Take 1/2-2 pills () nightly as needed. Replaces ambien for sleep    Chronic bilateral low back pain without sciatica  -     gabapentin (NEURONTIN) 300 mg capsule; Take 1 Cap by mouth two (2) times a day. Take 300-400 mg in AM, 300-600 mg at night for restless legs, neuropathy, back pain    Loose stools - chronic. Can try adding fiber and then also consider weaning back colestipol dse  -     wheat dextrin (BENEFIBER) 3 gram/4 gram packet; Take 1 Packet by mouth three (3) times daily as needed. lab results and schedule of future lab studies reviewed with patient  reviewed medications and side effects in detail    Return to clinic for further evaluation if new symptoms develop    Follow-up Disposition: Not on File    Current Outpatient Prescriptions   Medication Sig    gabapentin (NEURONTIN) 300 mg capsule Take 1 Cap by mouth two (2) times a day.  Take 300-400 mg in AM, 300-600 mg at night for restless legs, neuropathy, back pain    traZODone (DESYREL) 50 mg tablet Take 1/2-2 pills () nightly as needed. Replaces ambien for sleep    wheat dextrin (BENEFIBER) 3 gram/4 gram packet Take 1 Packet by mouth three (3) times daily as needed.  hydrocortisone (ANUSOL-HC) 25 mg supp Insert 1 Suppository into rectum two (2) times a day.  witch hazel-glycerin (TUCKS) 12.5-50 % pad 1 Pad by PeriANAL route as needed for Pain.  warfarin (COUMADIN) 1 mg tablet TAKE 1 TABLET DAILY OR AS DIRECTED BY THE COUMADIN CLINIC    tamsulosin (FLOMAX) 0.4 mg capsule TAKE 1 CAPSULE DAILY    finasteride (PROSCAR) 5 mg tablet TAKE 1 TABLET DAILY    amLODIPine (NORVASC) 5 mg tablet TAKE 1 TABLET DAILY    warfarin (COUMADIN) 5 mg tablet TAKE 1 TABLET DAILY OR AS DIRECTED (Patient taking differently: TAKE 1.5 TABLET DAILY OR AS DIRECTED)    colestipol (COLESTID) 1 gram tablet TAKE 2 TABLETS TWICE A DAY    flecainide (TAMBOCOR) 150 mg tablet TAKE 1 TABLET TWICE A DAY    sertraline (ZOLOFT) 100 mg tablet TAKE 1 TABLET DAILY    valsartan (DIOVAN) 320 mg tablet Take 1 Tab by mouth daily. Indications: HYPERTENSION     No current facility-administered medications for this visit. Acute chest pain

## 2021-12-06 NOTE — H&P ADULT - NSHPSOCIALHISTORY_GEN_ALL_CORE
SOCIAL HISTORY:    Marital Status:  ( x )    (  ) Single        (  )        (  )        (  )   Occupation:   Lives with:        ( x ) Alone       (  ) Spouse      (  ) Children        (  ) Parents           (  ) Other        (daughter Kristian, who lives in [place], is very involved in her care - see name/number above)    No history of smoking  No history of alcohol abuse  No history of illegal drug use

## 2021-12-06 NOTE — H&P ADULT - PROBLEM SELECTOR PLAN 2
- talking on the telephone, taking a few steps... per daughter  - onset since initiation of insulin therapy ~ 2 weeks ago, per daughter  - troponin  and ECG as above  - f/u pro-BNP level (does not appear fluid overloaded at this time)  - no suspicion for URI at present  - TSH within acceptable range  - f/u TTE (ordered)  - HOB elevation - talking on the telephone, taking a few steps... per daughter  - onset since initiation of insulin therapy ~ 2 weeks ago, per daughter  - troponin  and ECG as above  - f/u pro-BNP level (does not appear fluid overloaded at this time)  - no suspicion for URI at present  - TSH within acceptable range  - f/u TTE (ordered)  - HOB elevation  - Cardiologist resuming care in the AM

## 2021-12-06 NOTE — H&P ADULT - NSHPPHYSICALEXAM_GEN_ALL_CORE
Vital Signs Last 24 Hrs  T(C): 37 (06 Dec 2021 05:45), Max: 37 (06 Dec 2021 05:45)  T(F): 98.6 (06 Dec 2021 05:45), Max: 98.6 (06 Dec 2021 05:45)  HR: 56 (06 Dec 2021 05:45) (56 - 63)  BP: 134/76 (06 Dec 2021 05:45) (134/76 - 166/93)  BP(mean): --  RR: 15 (06 Dec 2021 05:45) (15 - 18)  SpO2: 97% (06 Dec 2021 05:45) (97% - 100%)    ===========================================================    PHYSICAL EXAMINATION:    APPEARANCE: Adequately groomed, adequately nourished, thin female, lying propped up in stretcher in NAD.  Appears worried.  HEENT: Mildly dry oral mucosa.  PERRL, EOMI	  LYMPHATIC: No lymphadenopathy appreciated  CARDIOVASCULAR: (+) S1 S2.  No JVD.  No gross murmurs appreciated.  No edema  RESPIRATORY: No wheezing, rhonchi, crackles appreciated  GASTROINTESTINAL:  Soft, Non-tender, (+) BS  GENITOURINARY: No suprapubic tenderness.  No CVA tenderness B/L  SKIN: No rashes. No ecchymoses.  No cyanosis  PSYCHIATRIC: A&O x 3.  Mood & affect appropriate to situation  NEUROLOGICAL: Non-focal, TOLENTINO x 4 against gravity  EXTREMITIES: Normal range of motion.  No clubbing, cyanosis or edema  VASCULAR: Peripheral pulses palpable

## 2021-12-06 NOTE — H&P ADULT - ASSESSMENT
[ x ]  Lab studies reviewed  [ x ]  Radiology reviewed  [ x ]  Old records reviewed    66 year old female, with past history significant for Hypertension, Type-2 DM (started on insulin 2 weeks ago), Dyslipidemia and Osteoporosis, presented to the ED secondary to persistent chest pain.  Vital signs upon ED presentation as follows: BP = 158/91, HR = 63, RR = 16, T = 36.7 C (98 F), O2 Sat = 100% on RA.  Diagnosed with Chest Pain.

## 2021-12-06 NOTE — PATIENT PROFILE ADULT - FALL HARM RISK - UNIVERSAL INTERVENTIONS
Bed in lowest position, wheels locked, appropriate side rails in place/Call bell, personal items and telephone in reach/Instruct patient to call for assistance before getting out of bed or chair/Non-slip footwear when patient is out of bed/[place] to call system/Physically safe environment - no spills, clutter or unnecessary equipment/Purposeful Proactive Rounding/Room/bathroom lighting operational, light cord in reach

## 2021-12-06 NOTE — H&P ADULT - NSHPREVIEWOFSYSTEMS_GEN_ALL_CORE
REVIEW OF SYSTEMS:    CONSTITUTIONAL: Dizziness, HA associated w/ elevated BP.  No weakness, fever, chills or sweating  EYES/ENT: No visual changes.  No dysphagia  NECK: No pain or stiffness  RESPIRATORY: No cough or hemoptysis.  Shortness of breath with minimal exertion  CARDIOVASCULAR: B/L and midsternal chest pain, palpitations, shortness of breath - associated w/ elevated BP. No lower extremity edema  GASTROINTESTINAL: No epigastric or abdominal pain. No nausea, vomiting or hematemesis.  Occasional constipation.  No diarrhea. No melena or hematochezia.  GENITOURINARY: No dysuria, frequency or hematuria  MUSCULOSKELETAL: No joint pain, swelling, decreased ROM, erythema, warmth  NEUROLOGICAL: No numbness or weakness  PSYCHIATRY: No anxiety, or depression.  SKIN: No itching, burning, rashes, or lesions   All other review of systems is negative unless indicated above.

## 2021-12-06 NOTE — ED PROVIDER NOTE - CLINICAL SUMMARY MEDICAL DECISION MAKING FREE TEXT BOX
66F c/o episodic exertional CP, assoc. w SOB, lasting for hours tonight, longer than prev., resolved at time of eval. EKG, labs, likely ACS/unstable angina, plan for CDU vs. admission.

## 2021-12-06 NOTE — PATIENT PROFILE ADULT - ARE SIGNIFICANT INDICATORS COMPLETE.
no fever, chills, headache, dizziness, chest pain, palpitations, hemoptysis, N/V/D, abdominal pain, LE numbness/weakness,/no hallucinations/no homicidal No Yes

## 2021-12-06 NOTE — ED ADULT NURSE NOTE - CHIEF COMPLAINT QUOTE
Pt. is brought to [facility] ED by ROSANGELA for chest pain after exertion that started this evening. Pt.'s medications have been adjusted due to HTN.  PMH: HTN, pre-DM. Pt. is ambulatory at triage. Appears well. Daughter Kristian:  [phone]. NAD noted. .

## 2021-12-06 NOTE — ED PROVIDER NOTE - ATTENDING CONTRIBUTION TO CARE
66F with several episodes of CP over 3-4d, with walking, typically resolving within 10 min. but tonight not resolving, constant x hrs., and assoc. w SOB. No diaphoresis/N/V, + palpitations, no lightheadedness/dizziness, no leg c/o, no orthopnea/PND. Pt. with hx of HTN/DM and no prev. cardiac workup. MMM, clear lungs, heart reg, abd soft, NT to palp, no CVAT, no edema, NT calves.

## 2021-12-06 NOTE — CONSULT NOTE ADULT - ASSESSMENT
66 year old female, with past history significant for Hypertension, Type-2 DM (started on insulin 2 weeks ago), Dyslipidemia and Osteoporosis, presented to the ED secondary to persistent chest pain.    EKG: NSR TWI v1-v3      1. Chest pain  -trops negative  -CXR clear  -echo pending  -discussed LHC vs stress with patient and daughter. patient to decide, will f/u tomorrow  -c/w asa and atorvastatin    2. HTN  -improving  -c/w losaratan 50mg daily and coreg 12.5mg BID  -continue to monitor BP     3. DM  -c/w insulin    4. DVT prophylaxis  -lovenox subq
66 year old female, with Hypertension, uncontrolled Type-2 DM (started on insulin 2 weeks ago, A1c 10.9% 12/2021) complicated by neuropathy and gastroparesis, Dyslipidemia and Osteoporosis, presented to the ED secondary to persistent chest pain.      Stress hyperglycemia   Type 2 DM, uncontrolled   gastroparesis, controlled   - A1c 10.9%, a1c goal < 7%   - pt has a good kidney function: GFR/Cr 105/0.46.  She was recently started on insulin, unsure why PO was stopped and only Lantus was started.  Home medication: Lantus 20 U HS.  Pt was tolerating PO medications.  Also, pt reports vomiting was very infrequent.       - Not in DKA on admission     - Agree with Lantus 15 U HS   - would start Lispro 3 U TID with meals; do not give scheduled prandial insulin if patient is NPO  - c/w moderate SS with meals and low dose HS   - Pt has been extremely worried about food intake to the point she is skipping dinner, would consult dietician   - please monitor fingerstick blood glucose at least 4 times a day to avoid insulin toxicity, hypoglycemia; Blood glucose target while hospitalized 140-180 mg/dL  - Carbohydrate controlled diet, no concentrated sweets  - Counseled on need for medication adherence to avoid new complications.     Hypertension  - BP goal <130/80, tx as per primary team     DM Discharge planning  Please contact diabetes team prior to patient's discharge for finalized regimen. Please allow 24-48 hours for discharge planning.   Possible discharge on basal insulin and PO medications      Morelia Shelby MD  Attending Physician   Department of Endocrinology, Diabetes and Metabolism   Cell: [phone]    If before 9AM or after 5PM, or on weekends/holidays, please call the Endocrine answering service for assistance ([phone]).  For nonurgent matters, please email [email or web address] for assistance.

## 2021-12-06 NOTE — H&P ADULT - PROBLEM SELECTOR PLAN 3
- ECG w/ finding of: sinus bradycardia @ 56 bpm, TWI in V2, V3, V4.  QTc = 445  - in the setting of chest pain, uncontrolled Type-2 DM  - report of "abnormal" ECG PTA  - troponin as above  - f/u TTE  - Cardiology consult in the AM (please call) - ECG w/ finding of: sinus bradycardia @ 56 bpm, TWI in V2, V3, V4.  QTc = 445  - in the setting of chest pain, uncontrolled Type-2 DM  - report of "abnormal" ECG PTA  - troponin as above  - f/u TTE  - Cardiologist resuming care in the AM

## 2021-12-06 NOTE — ED PROVIDER NOTE - OBJECTIVE STATEMENT
67 yo F PMHx HTN, HLD, osteoporosis, IDDM (recently started on Insulin 2 weeks ago), presents to the ED c/o chest pain. States it initially began about 1 week ago but it worsened today. Pt was walking when she felt worse midsternal chest pain, non radiating. Tonight's episode lasted for about 4 hours and thus, prompts this ED visit. She has no CP now. It was also associated with SOB, diaphoresis, palpitations, and headaches. She denies nausea, vomiting, fever, chills, abd pain, leg swelling, dizziness, passing out. 65 yo F PMHx HTN, HLD, osteoporosis, IDDM (recently started on Insulin 2 weeks ago), presents to the ED c/o chest pain. States it initially began about 1 week ago but it worsened today. Pt was walking when she felt worse midsternal chest pain, non radiating. Tonight's episode lasted for about 4 hours and thus, prompts this ED visit. She has no CP now. It was also associated with SOB, diaphoresis, palpitations, and headaches. She denies nausea, vomiting, fever, chills, abd pain, leg swelling, dizziness, passing out.  Cardiologist Dr. Mauro Mendoza  Daughter Kristian [phone]

## 2021-12-06 NOTE — CONSULT NOTE ADULT - SUBJECTIVE AND OBJECTIVE BOX
Jean-Pierre Santos MD  Interventional Cardiology / Advance Heart Failure and Cardiac Transplant Specialist  [place] Office : [phone] [place] [address] [place] [address]  Tel:   [place] Office : [address] [facility] [place] [address]  Tel: [phone]  Cell : [phone]    HISTORY OF PRESENTING ILLNESS:  66 year old female, with past history significant for Hypertension, Type-2 DM (started on insulin 2 weeks ago), Dyslipidemia and Osteoporosis, presented to the ED secondary to persistent chest pain.  Seen and evaluated at bedside; appears worried, but in NAD.  Reports uncontrolled DM requiring initiation of insulin (Lantus) approximately 2 weeks ago; initially started at 10 units, increased to 15 and is now at 10 units HS because of uncontrolled values.  Chest pain and associated signs/symptoms began after insulin prescription.  Notes chest pain involving the bilateral chest and ?midsternal area, associated with elevated blood pressure, dizziness, bitemporal headaches (sometimes global headaches) and a "whooshing" sounds at times.  Chest pain reportedly intermittent for the past ~ one week, but was of longer duration today than usual; lasted approximately 4 hours and was at the midsternal area.  Blood pressure has been periodically elevated and PMD increased Losartan from 50 mg daily to 50 mg BID, but remains uncontrolled.  Elevated BP is associated with chest pain, palpitations, shortness of breath, dizziness, headaches (as noted above).  Patient complains of FLORES; daughter states that even with patient talking on the telephone for 10 to 15 minutes causes shortness of breath.  Dyspnea on minimal exertion.  This is noted since insulin therapy was started.  Patient has been to 3 different doctors (including PMD and Cardiologist - Dr Mendoza); told by PMD that ECG was abnormal and subsequently saw cardiologist. Has been following with cardiologist. Never had cardiac cath performed. Echo 2020 with normal LV function and moderate aortic regurgitation.   	  MEDICATIONS:  aspirin enteric coated 81 milliGRAM(s) Oral daily  carvedilol 12.5 milliGRAM(s) Oral every 12 hours  enoxaparin Injectable 40 milliGRAM(s) SubCutaneous at bedtime  losartan 50 milliGRAM(s) Oral daily        acetaminophen     Tablet .. 650 milliGRAM(s) Oral every 6 hours PRN  melatonin 3 milliGRAM(s) Oral at bedtime PRN    aluminum hydroxide/magnesium hydroxide/simethicone Suspension 30 milliLiter(s) Oral every 4 hours PRN  magnesium hydroxide Suspension 30 milliLiter(s) Oral daily PRN    atorvastatin 40 milliGRAM(s) Oral at bedtime  dextrose 40% Gel 15 Gram(s) Oral once  dextrose 50% Injectable 25 Gram(s) IV Push once  dextrose 50% Injectable 12.5 Gram(s) IV Push once  dextrose 50% Injectable 25 Gram(s) IV Push once  glucagon  Injectable 1 milliGRAM(s) IntraMuscular once  insulin glargine Injectable (LANTUS) 15 Unit(s) SubCutaneous at bedtime  insulin lispro (ADMELOG) corrective regimen sliding scale   SubCutaneous three times a day before meals  insulin lispro (ADMELOG) corrective regimen sliding scale   SubCutaneous at bedtime    calcium carbonate 1250 mG  + Vitamin D (OsCal 500 + D) 1 Tablet(s) Oral two times a day  dextrose 5%. 1000 milliLiter(s) IV Continuous <Continuous>  dextrose 5%. 1000 milliLiter(s) IV Continuous <Continuous>  lactated ringers. 1000 milliLiter(s) IV Continuous <Continuous>  multivitamin 1 Tablet(s) Oral daily      PAST MEDICAL/SURGICAL HISTORY  PAST MEDICAL & SURGICAL HISTORY:  DM (diabetes mellitus)    Hypertension    High cholesterol    Constipation    History of cholecystectomy  ~ 2017        SOCIAL HISTORY: Substance Use (street drugs): ( x ) never used  (  ) other:    FAMILY HISTORY:  No pertinent family history in first degree relatives        REVIEW OF SYSTEMS:  CONSTITUTIONAL: No fever, weight loss, or fatigue  EYES: No eye pain, visual disturbances, or discharge  ENMT:  No difficulty hearing, tinnitus, vertigo; No sinus or throat pain  BREASTS: No pain, masses, or nipple discharge  GASTROINTESTINAL: No abdominal or epigastric pain. No nausea, vomiting, or hematemesis; No diarrhea or constipation. No melena or hematochezia.  GENITOURINARY: No dysuria, frequency, hematuria, or incontinence  NEUROLOGICAL: No headaches, memory loss, loss of strength, numbness, or tremors  ENDOCRINE: No heat or cold intolerance; No hair loss  MUSCULOSKELETAL: No joint pain or swelling; No muscle, back, or extremity pain  PSYCHIATRIC: No depression, anxiety, mood swings, or difficulty sleeping  HEME/LYMPH: No easy bruising, or bleeding gums  All others negative    PHYSICAL EXAM:  T(C): 36.9 (12-06-21 @ 14:15), Max: 37 (12-06-21 @ 05:45)  HR: 62 (12-06-21 @ 14:15) (56 - 73)  BP: 137/55 (12-06-21 @ 14:15) (124/61 - 166/93)  RR: 17 (12-06-21 @ 14:15) (15 - 18)  SpO2: 100% (12-06-21 @ 14:15) (97% - 100%)  Wt(kg): --  I&O's Summary    Height (cm): 152.4 (12-06 @ 01:40)  Weight (kg): 45.359 (12-06 @ 01:40)  BMI (kg/m2): 19.5 (12-06 @ 01:40)  BSA (m2): 1.39 (12-06 @ 01:40)    GENERAL: NAD  EYES: EOMI, PERRLA, conjunctiva and sclera clear  ENMT: No tonsillar erythema, exudates, or enlargement; Moist mucous membranes, Good dentition, No lesions  Cardiovascular: Normal S1 S2, No JVD, No murmurs, No edema  Respiratory: Lungs clear to auscultation	  Gastrointestinal:  Soft, Non-tender, + BS	  Extremities: Normal range of motion, No clubbing, cyanosis or edema  LYMPH: No lymphadenopathy noted  NERVOUS SYSTEM:  Alert & Oriented X3, Good concentration; Motor Strength 5/5 B/L upper and lower extremities; DTRs 2+ intact and symmetric                                    14.2   7.20  )-----------( 280      ( 06 Dec 2021 01:42 )             42.4     12-06    139  |  106  |  14  ----------------------------<  202<H>  4.0   |  23  |  0.46<L>    Ca    9.1      06 Dec 2021 06:39  Phos  4.3     12-06  Mg     2.10     12-06    TPro  6.7  /  Alb  4.2  /  TBili  0.5  /  DBili  x   /  AST  21  /  ALT  31  /  AlkPhos  94  12-06    proBNP: Serum Pro-Brain Natriuretic Peptide: 97 pg/mL (12-06 @ 06:43)    Lipid Profile:   HgA1c:   TSH: Thyroid Stimulating Hormone, Serum: 3.43 uIU/mL (12-06 @ 06:39)      Consultant(s) Notes Reviewed:  [x ] YES  [ ] NO    Care Discussed with Consultants/Other Providers [ x] YES  [ ] NO    Imaging Personally Reviewed independently:  [x] YES  [ ] NO    All labs, radiologic studies, vitals, orders and medications list reviewed. Patient is seen and examined at bedside. Case discussed with medical team.

## 2021-12-06 NOTE — ED PROVIDER NOTE - PHYSICAL EXAMINATION
PHYSICAL EXAM:  GENERAL: non-toxic appearing; in no respiratory distress  HEAD Atraumatic, Normocephalic  NECK: No JVD; trachea midline  EYES: PERRL, EOMs intact b/l w/out deficits; normal conjunctiva  CHEST/LUNG: CTAB no wheezes/rhonchi/rales  HEART: RRR no murmur/gallops/rubs  ABDOMEN: +BS, soft, NT, ND  EXTREMITIES: No LE edema, +2 radial pulses b/l, +2 DP/PT pulses b/l  MUSCULOSKELETAL: FROM of all 4 extremities;  NERVOUS SYSTEM:  A&Ox3, No motor deficits or sensory deficits; CNII-XII intact; Speech is fluent and appropriate  SKIN:  Warm and dry as visualized

## 2021-12-06 NOTE — PATIENT PROFILE ADULT - DO YOU FEEL LIKE HURTING YOURSELF OR OTHERS?
EKG normal  Schedule stress test, if you can't get in next week let me know so we can get you in somewhere  To emergency room with any worsening symptoms   I will follow up with chest xray results  If stress test shows this is not from your heart then we will need to adjust asthma medications and get you in with a specialist    
no

## 2021-12-06 NOTE — H&P ADULT - PROBLEM SELECTOR PLAN 4
- report of  to 200 today.  Has associated, bitemporal, and at times global, headaches, dizziness, chest pain, palpitations  - has been having difficulty w/ uncontrolled BP since initiation of insulin tx ~ 2 weeks ago (per patient/daughter)  - on carvedilol 12.5 mg BID, losartan (increased from 50 mg QD to 50 mg BID 2 days ago); continuing both  - modify therapy, as needed- DASH-TLC diet

## 2021-12-06 NOTE — ED PROVIDER NOTE - NSICDXPASTMEDICALHX_GEN_ALL_CORE_FT
PAST MEDICAL HISTORY:  Diabetes     DM (diabetes mellitus)     High cholesterol     Hypertension

## 2021-12-06 NOTE — CONSULT NOTE ADULT - SUBJECTIVE AND OBJECTIVE BOX
Reason For Consult:     HPI:  66 year old female, with past history significant for Hypertension, Type-2 DM (started on insulin 2 weeks ago), Dyslipidemia and Osteoporosis, presented to the ED secondary to persistent chest pain.  Seen and evaluated at bedside; appears worried, but in NAD.  Reports uncontrolled DM requiring initiation of insulin (Lantus) approximately 2 weeks ago; initially started at 10 units, increased to 15 and is now at 10 units HS because of uncontrolled values.  Chest pain and associated signs/symptoms began after insulin prescription.  Notes chest pain involving the bilateral chest and ?midsternal area, associated with elevated blood pressure, dizziness, bitemporal headaches (sometimes global headaches) and a "whooshing" sounds at times.  Chest pain reportedly intermittent for the past ~ one week, but was of longer duration today than usual; lasted approximately 4 hours and was at the midsternal area.  Blood pressure has been periodically elevated and PMD increased Losartan from 50 mg daily to 50 mg BID, but remains uncontrolled.  BP was elevated today ('s to 200).  Elevated BP is associated with chest pain, palpitations, shortness of breath, dizziness, headaches (as noted above).  Patient complains of FLORES; daughter states that even with patient talking on the telephone for 10 to 15 minutes causes shortness of breath.  Dyspnea on minimal exertion.  This is noted since insulin therapy was started.  Patient has been to 3 different doctors (including PMD and Cardiologist - Dr Mendoza); told by PMD that ECG was abnormal and subsequently saw cardiologist.    Vital signs upon ED presentation as follows: BP = 158/91, HR = 63, RR = 16, T = 36.7 C (98 F), O2 Sat = 100% on RA.  Diagnosed with Chest Pain.  Prescribed aspirin 324 mg  in the ED. (06 Dec 2021 06:46)      PAST MEDICAL & SURGICAL HISTORY:  DM (diabetes mellitus)    Hypertension    High cholesterol    Constipation    History of cholecystectomy  ~ 2017        FAMILY HISTORY:  No pertinent family history in first degree relatives        Social History:    Outpatient Medications:    MEDICATIONS  (STANDING):  aspirin enteric coated 81 milliGRAM(s) Oral daily  atorvastatin 40 milliGRAM(s) Oral at bedtime  calcium carbonate 1250 mG  + Vitamin D (OsCal 500 + D) 1 Tablet(s) Oral two times a day  carvedilol 12.5 milliGRAM(s) Oral every 12 hours  dextrose 40% Gel 15 Gram(s) Oral once  dextrose 5%. 1000 milliLiter(s) (50 mL/Hr) IV Continuous <Continuous>  dextrose 5%. 1000 milliLiter(s) (100 mL/Hr) IV Continuous <Continuous>  dextrose 50% Injectable 25 Gram(s) IV Push once  dextrose 50% Injectable 12.5 Gram(s) IV Push once  dextrose 50% Injectable 25 Gram(s) IV Push once  enoxaparin Injectable 40 milliGRAM(s) SubCutaneous at bedtime  glucagon  Injectable 1 milliGRAM(s) IntraMuscular once  insulin glargine Injectable (LANTUS) 15 Unit(s) SubCutaneous at bedtime  insulin lispro (ADMELOG) corrective regimen sliding scale   SubCutaneous three times a day before meals  insulin lispro (ADMELOG) corrective regimen sliding scale   SubCutaneous at bedtime  lactated ringers. 1000 milliLiter(s) (100 mL/Hr) IV Continuous <Continuous>  losartan 50 milliGRAM(s) Oral daily  multivitamin 1 Tablet(s) Oral daily    MEDICATIONS  (PRN):  acetaminophen     Tablet .. 650 milliGRAM(s) Oral every 6 hours PRN Mild Pain (1 - 3)  aluminum hydroxide/magnesium hydroxide/simethicone Suspension 30 milliLiter(s) Oral every 4 hours PRN Dyspepsia  magnesium hydroxide Suspension 30 milliLiter(s) Oral daily PRN Constipation  melatonin 3 milliGRAM(s) Oral at bedtime PRN Insomnia      Allergies    Cipro (Unknown)  pcn cipro sulfa (Unknown)  penicillin (Hives)  penicillin (Unknown)  sulfa drugs (Unknown)    Intolerances      Review of Systems:  Constitutional: No fever  Eyes: No blurry vision  Neuro: No tremors  HEENT: No pain  Cardiovascular: No chest pain, palpitations  Respiratory: No SOB, no cough  GI: No nausea, vomiting, abdominal pain  : No dysuria  Skin: no rash  Psych: no depression  Endocrine: no polyuria, polydipsia  Hem/lymph: no swelling  Osteoporosis: no fractures    ALL OTHER SYSTEMS REVIEWED AND NEGATIVE    UNABLE TO OBTAIN    PHYSICAL EXAM:  VITALS: T(C): 36.7 (12-06-21 @ 10:18)  T(F): 98 (12-06-21 @ 10:18), Max: 98.6 (12-06-21 @ 05:45)  HR: 73 (12-06-21 @ 10:18) (56 - 73)  BP: 124/61 (12-06-21 @ 10:18) (124/61 - 166/93)  RR:  (15 - 18)  SpO2:  (97% - 100%)  Wt(kg): --  GENERAL: NAD, well-groomed, well-developed  EYES: No proptosis, no lid lag, anicteric  HEENT:  Atraumatic, Normocephalic, moist mucous membranes  THYROID: Normal size, no palpable nodules  RESPIRATORY: Clear to auscultation bilaterally; No rales, rhonchi, wheezing, or rubs  CARDIOVASCULAR: Regular rate and rhythm; No murmurs; no peripheral edema  GI: Soft, nontender, non distended, normal bowel sounds  SKIN: Dry, intact, No rashes or lesions  MUSCULOSKELETAL: Full range of motion, normal strength  NEURO: sensation intact, extraocular movements intact, no tremor, normal reflexes  PSYCH: Alert and oriented x 3, normal affect, normal mood  CUSHING'S SIGNS: no striae    POCT Blood Glucose.: 249 mg/dL (12-06-21 @ 11:34)  POCT Blood Glucose.: 184 mg/dL (12-06-21 @ 06:12)  POCT Blood Glucose.: 265 mg/dL (12-06-21 @ 00:52)                            14.2   7.20  )-----------( 280      ( 06 Dec 2021 01:42 )             42.4       12-06    139  |  106  |  14  ----------------------------<  202<H>  4.0   |  23  |  0.46<L>    EGFR if : 122  EGFR if non : 105    Ca    9.1      12-06  Mg     2.10     12-06  Phos  4.3     12-06    TPro  6.7  /  Alb  4.2  /  TBili  0.5  /  DBili  x   /  AST  21  /  ALT  31  /  AlkPhos  94  12-06      Thyroid Function Tests:  12-06 @ 06:39 TSH 3.43 FreeT4 -- T3 -- Anti TPO -- Anti Thyroglobulin Ab -- TSI --          12-06 Chol 138 Direct LDL -- LDL calculated 71 HDL 55 Trig 59    Radiology:              Reason For Consult: Hyperglycemia     HPI:  66 year old female, with Hypertension, uncontrolled Type-2 DM (started on insulin 2 weeks ago, A1c 10.9% 12/2021) complicated by neuropathy and gastroparesis, Dyslipidemia and Osteoporosis, presented to the ED secondary to persistent chest pain.  History obtained from the patient and daughter over the phone.  Patient was started on insulin 2 weeks ago, initially 10 U Lantus which was increased to 20 U for uncontrolled BG.  Patient checks BG 2x/day: FBG and before going to bed.  FBG ranges in 250 and before bedtime ranges in 350.  Since developing gastroparesis and seeing uncontrolled DM, pt has been worried and has become extremely picky about eating, usually skips dinner, has 1 cup milk around 5 Pm, which is the last thing she eats.       Diabetes history:   - Dx ~ 2011   - Family history: mother with type 2 DM   - Home medications: Lantus 20 U HS.  Has taken metformin, Jardiance in the past without any side effects.  Tolerated those medications   - Insulin: started 2 weeks ago   - Endocrinologist: Dr. Amy Drake at [facility]   - SMBG: FBG ~ 250, evening before bedtime ~ 350   - Complications: + gastroparesis, +neuropathy   - Weight: stable   - Diet:   Breakfast: milk, eggs  Lunch: chicken, salad   no snacks, no dinner, only drinks water     ROS: dizziness, polyuria, polydipsia  Reports vomiting infrequently - unsure why PO was stopped and only Lantus was started     Vital signs upon ED presentation as follows: BP = 158/91, HR = 63, RR = 16, T = 36.7 C (98 F), O2 Sat = 100% on RA.  Diagnosed with Chest Pain.  Prescribed aspirin 324 mg  in the ED. (06 Dec 2021 06:46)    PAST MEDICAL & SURGICAL HISTORY:  DM (diabetes mellitus)    Hypertension    High cholesterol    Constipation    History of cholecystectomy  ~ 2017    FAMILY HISTORY:  as per HPI     Social History:  lives by herself   denies alcohol, tobacco or IVDA     Outpatient Medications:  MEDICATIONS  (STANDING):  aspirin enteric coated 81 milliGRAM(s) Oral daily  atorvastatin 40 milliGRAM(s) Oral at bedtime  calcium carbonate 1250 mG  + Vitamin D (OsCal 500 + D) 1 Tablet(s) Oral two times a day  carvedilol 12.5 milliGRAM(s) Oral every 12 hours  dextrose 40% Gel 15 Gram(s) Oral once  dextrose 5%. 1000 milliLiter(s) (50 mL/Hr) IV Continuous <Continuous>  dextrose 5%. 1000 milliLiter(s) (100 mL/Hr) IV Continuous <Continuous>  dextrose 50% Injectable 25 Gram(s) IV Push once  dextrose 50% Injectable 12.5 Gram(s) IV Push once  dextrose 50% Injectable 25 Gram(s) IV Push once  enoxaparin Injectable 40 milliGRAM(s) SubCutaneous at bedtime  glucagon  Injectable 1 milliGRAM(s) IntraMuscular once  insulin glargine Injectable (LANTUS) 15 Unit(s) SubCutaneous at bedtime  insulin lispro (ADMELOG) corrective regimen sliding scale   SubCutaneous three times a day before meals  insulin lispro (ADMELOG) corrective regimen sliding scale   SubCutaneous at bedtime  lactated ringers. 1000 milliLiter(s) (100 mL/Hr) IV Continuous <Continuous>  losartan 50 milliGRAM(s) Oral daily  multivitamin 1 Tablet(s) Oral daily    MEDICATIONS  (PRN):  acetaminophen     Tablet .. 650 milliGRAM(s) Oral every 6 hours PRN Mild Pain (1 - 3)  aluminum hydroxide/magnesium hydroxide/simethicone Suspension 30 milliLiter(s) Oral every 4 hours PRN Dyspepsia  magnesium hydroxide Suspension 30 milliLiter(s) Oral daily PRN Constipation  melatonin 3 milliGRAM(s) Oral at bedtime PRN Insomnia    Allergies  Cipro (Unknown)  pcn cipro sulfa (Unknown)  penicillin (Hives)  penicillin (Unknown)  sulfa drugs (Unknown)    Review of Systems:  Constitutional: No fever  Eyes: No blurry vision  Neuro: No tremors  HEENT: No pain  Cardiovascular: No chest pain, palpitations  Respiratory: No SOB, no cough  GI: No nausea, vomiting, abdominal pain  : No dysuria  Skin: no rash  Psych: no depression  Endocrine: no polyuria, polydipsia  Hem/lymph: no swelling  Osteoporosis: no fractures  ALL OTHER SYSTEMS REVIEWED AND NEGATIVE    PHYSICAL EXAM:  VITALS: T(C): 36.7 (12-06-21 @ 10:18)  T(F): 98 (12-06-21 @ 10:18), Max: 98.6 (12-06-21 @ 05:45)  HR: 73 (12-06-21 @ 10:18) (56 - 73)  BP: 124/61 (12-06-21 @ 10:18) (124/61 - 166/93)  RR:  (15 - 18)  SpO2:  (97% - 100%)  Wt(kg): --  GENERAL: NAD, well-groomed, well-developed  EYES: No proptosis, no lid lag, anicteric  HEENT:  Atraumatic, Normocephalic, moist mucous membranes  THYROID: Normal size, no palpable nodules  RESPIRATORY: Clear to auscultation bilaterally; No rales, rhonchi, wheezing, or rubs  CARDIOVASCULAR: Regular rate and rhythm; No murmurs; no peripheral edema  GI: Soft, nontender, non distended, normal bowel sounds  SKIN: Dry, intact, No rashes or lesions  MUSCULOSKELETAL: Full range of motion, normal strength  NEURO: sensation intact, extraocular movements intact, no tremor, normal reflexes  PSYCH: Alert and oriented x 3, normal affect, normal mood  CUSHING'S SIGNS: no striae    POCT Blood Glucose.: 249 mg/dL (12-06-21 @ 11:34)  POCT Blood Glucose.: 184 mg/dL (12-06-21 @ 06:12)  POCT Blood Glucose.: 265 mg/dL (12-06-21 @ 00:52)                        14.2   7.20  )-----------( 280      ( 06 Dec 2021 01:42 )             42.4     12-06    139  |  106  |  14  ----------------------------<  202<H>  4.0   |  23  |  0.46<L>    EGFR if : 122  EGFR if non : 105    Ca    9.1      12-06  Mg     2.10     12-06  Phos  4.3     12-06    TPro  6.7  /  Alb  4.2  /  TBili  0.5  /  DBili  x   /  AST  21  /  ALT  31  /  AlkPhos  94  12-06    Thyroid Function Tests:  12-06 @ 06:39 TSH 3.43 FreeT4 -- T3 -- Anti TPO -- Anti Thyroglobulin Ab -- TSI --    12-06 Chol 138 Direct LDL -- LDL calculated 71 HDL 55 Trig 59

## 2021-12-06 NOTE — H&P ADULT - PROBLEM SELECTOR PLAN 5
- glucose = 266 on CMP; no recent A1c for comparison  - report that blood glucose uncontrolled - in the 300's range at home, so was started on Lantus 10 units, but since uncontrolled, titrated to 15 units and is now at 20 units HS  - notes signs/symptoms, as above, since starting Lantus  - f/u A1c level in the AM  - consistent carb diet  - will benefit from Endocrinology consult in the AM (please call) - glucose = 266 on CMP; no recent A1c for comparison  - report that blood glucose uncontrolled - in the 300's range at home, so was started on Lantus 10 units, but since uncontrolled, titrated to 15 units and is now at 20 units HS  - notes signs/symptoms, as above, since starting Lantus  - currently prescribed Lantus 15 units HS, as well as m-ISS per FS  - f/u A1c level in the AM  - consistent carb diet  - Endocrinology consult in the AM (please call) [uncontrolled type-2 DM and possible insulin s/e]

## 2021-12-06 NOTE — ED PROVIDER NOTE - NS ED ROS FT
Constitutional: no fevers; no chills  HEENT: no visual changes, no sore throat, no rhinorrhea  CV: cp; no palpitations  Resp: sob; no cough  GI: no abd pain, no nausea, no vomiting, no diarrhea, no constipation  : no dysuria, no hematuria  MSK: no myalgias; no arthralgias  skin: no rashes  neuro: no HA, no numbness; no weakness, no tingling  endo: no polyuria, no polydipsia

## 2021-12-06 NOTE — H&P ADULT - PROBLEM SELECTOR PLAN 1
- presented with uncontrolled HTN, dyspnea on minimal exertion, headaches, palpitations, dizziness  - troponin = 7 --> 7  - ECG w/ finding of: sinus bradycardia @ 56 bpm, TWI in V2, V3, V4.  QTc = 445  - reportedly w/ "abnormal" ECG from PMD's office  - ED prescriptions: aspirin 324 mg x one  - currently continued on carvedilol 12 mg Q12H, losartan 50 mg Q12H, atorvastatin 40 mg HS, aspirin 81 mg  - f/u AM lab-work - including lipid panel, TSH, HgbA1c level  - f/u TTE (ordered)  - Cardiology consult in the AM (please call)  - continues on Telemetry monitoring - presented with uncontrolled HTN, dyspnea on minimal exertion, headaches, palpitations, dizziness  - troponin = 7 --> 7  - ECG w/ finding of: sinus bradycardia @ 56 bpm, TWI in V2, V3, V4.  QTc = 445  - reportedly w/ "abnormal" ECG from PMD's office  - ED prescriptions: aspirin 324 mg x one  - currently continued on carvedilol 12 mg Q12H, losartan 50 mg Q12H, atorvastatin 40 mg HS, aspirin 81 mg  - f/u AM lab-work - including lipid panel, TSH, HgbA1c level  - f/u TTE (ordered)  - Cardiologist resuming care in the AM  - continues on Telemetry monitoring

## 2021-12-06 NOTE — ED ADULT NURSE NOTE - OBJECTIVE STATEMENT
received pt in room tr a c/o intermittent headache, cp, sob for 1 1/2 weeks that usually resolves on its own when she feels her bp rising but today, pain remained from 8p-12mn.  denies pain por sob at this time.  pt speaks limited english.. mostly hinki speaking. 18 gauge inserted right ac. blood sent. placed on cardiac monitor. sr noted.  portable xray being done.  given asa as ordered.  for re-eval.

## 2021-12-06 NOTE — ED ADULT TRIAGE NOTE - CHIEF COMPLAINT QUOTE
Pt. is brought to [facility] ED by ROSANGELA for chest pain after exertion that started this evening. Pt.'s medications have been adjusted due to HTN.  PMH: HTN, pre-DM. Pt. is ambulatory at triage. Appears well. Daughter Kristian:  [phone]. NAD noted. Pt. is brought to [facility] ED by ROSANGELA for chest pain after exertion that started this evening. Pt.'s medications have been adjusted due to HTN.  PMH: HTN, pre-DM. Pt. is ambulatory at triage. Appears well. Daughter Kristian:  [phone]. NAD noted. .

## 2021-12-06 NOTE — H&P ADULT - NSHPLABSRESULTS_GEN_ALL_CORE
LAB-WORK/STUDIES:                          14.2   7.20  )-----------( 280      ( 06 Dec 2021 01:42 )             42.4     06 Dec 2021 06:39    139    |  106    |  14     ----------------------------<  202    4.0     |  23     |  0.46     Ca    9.1        06 Dec 2021 06:39  Phos  4.3       06 Dec 2021 06:39  Mg     2.10      06 Dec 2021 06:39    TPro  6.7    /  Alb  4.2    /  TBili  0.5    /  DBili  x      /  AST  21     /  ALT  31     /  AlkPhos  94     06 Dec 2021 01:42    LIVER FUNCTIONS - ( 06 Dec 2021 01:42 )  Alb: 4.2 g/dL / Pro: 6.7 g/dL / ALK PHOS: 94 U/L / ALT: 31 U/L / AST: 21 U/L / GGT: x           PT/INR - ( 06 Dec 2021 01:43 )   PT: 11.8 sec;   INR: 1.03 ratio      PTT - ( 06 Dec 2021 01:43 )  PTT:33.1 sec  CAPILLARY BLOOD GLUCOSE    POCT Blood Glucose.: 184 mg/dL (06 Dec 2021 06:12)  POCT Blood Glucose.: 265 mg/dL (06 Dec 2021 00:52)    =======================================================     ECG w/ finding of: sinus bradycardia @ 56 bpm, TWI in V2, V3, V4.  QTc = 445    =======================================================

## 2021-12-07 LAB
ANION GAP SERPL CALC-SCNC: 9 MMOL/L — SIGNIFICANT CHANGE UP (ref 7–14)
BUN SERPL-MCNC: 13 MG/DL — SIGNIFICANT CHANGE UP (ref 7–23)
CALCIUM SERPL-MCNC: 9.1 MG/DL — SIGNIFICANT CHANGE UP (ref 8.4–10.5)
CHLORIDE SERPL-SCNC: 105 MMOL/L — SIGNIFICANT CHANGE UP (ref 98–107)
CO2 SERPL-SCNC: 24 MMOL/L — SIGNIFICANT CHANGE UP (ref 22–31)
CREAT SERPL-MCNC: 0.44 MG/DL — LOW (ref 0.5–1.3)
GLUCOSE SERPL-MCNC: 211 MG/DL — HIGH (ref 70–99)
HCT VFR BLD CALC: 42.3 % — SIGNIFICANT CHANGE UP (ref 34.5–45)
HCV AB S/CO SERPL IA: 0.12 S/CO — SIGNIFICANT CHANGE UP (ref 0–0.99)
HCV AB SERPL-IMP: SIGNIFICANT CHANGE UP
HGB BLD-MCNC: 13.6 G/DL — SIGNIFICANT CHANGE UP (ref 11.5–15.5)
MAGNESIUM SERPL-MCNC: 2 MG/DL — SIGNIFICANT CHANGE UP (ref 1.6–2.6)
MCHC RBC-ENTMCNC: 29.4 PG — SIGNIFICANT CHANGE UP (ref 27–34)
MCHC RBC-ENTMCNC: 32.2 GM/DL — SIGNIFICANT CHANGE UP (ref 32–36)
MCV RBC AUTO: 91.4 FL — SIGNIFICANT CHANGE UP (ref 80–100)
NRBC # BLD: 0 /100 WBCS — SIGNIFICANT CHANGE UP
NRBC # FLD: 0 K/UL — SIGNIFICANT CHANGE UP
PHOSPHATE SERPL-MCNC: 3.8 MG/DL — SIGNIFICANT CHANGE UP (ref 2.5–4.5)
PLATELET # BLD AUTO: 260 K/UL — SIGNIFICANT CHANGE UP (ref 150–400)
POTASSIUM SERPL-MCNC: 4.2 MMOL/L — SIGNIFICANT CHANGE UP (ref 3.5–5.3)
POTASSIUM SERPL-SCNC: 4.2 MMOL/L — SIGNIFICANT CHANGE UP (ref 3.5–5.3)
RBC # BLD: 4.63 M/UL — SIGNIFICANT CHANGE UP (ref 3.8–5.2)
RBC # FLD: 13.2 % — SIGNIFICANT CHANGE UP (ref 10.3–14.5)
SODIUM SERPL-SCNC: 138 MMOL/L — SIGNIFICANT CHANGE UP (ref 135–145)
WBC # BLD: 5.92 K/UL — SIGNIFICANT CHANGE UP (ref 3.8–10.5)
WBC # FLD AUTO: 5.92 K/UL — SIGNIFICANT CHANGE UP (ref 3.8–10.5)

## 2021-12-07 PROCEDURE — 93306 TTE W/DOPPLER COMPLETE: CPT | Mod: 26

## 2021-12-07 PROCEDURE — 99233 SBSQ HOSP IP/OBS HIGH 50: CPT

## 2021-12-07 RX ORDER — INSULIN LISPRO 100/ML
6 VIAL (ML) SUBCUTANEOUS
Refills: 0 | Status: DISCONTINUED | OUTPATIENT
Start: 2021-12-07 | End: 2021-12-09

## 2021-12-07 RX ADMIN — Medication 1 TABLET(S): at 17:53

## 2021-12-07 RX ADMIN — ATORVASTATIN CALCIUM 40 MILLIGRAM(S): 80 TABLET, FILM COATED ORAL at 22:47

## 2021-12-07 RX ADMIN — Medication 3 UNIT(S): at 08:26

## 2021-12-07 RX ADMIN — LOSARTAN POTASSIUM 50 MILLIGRAM(S): 100 TABLET, FILM COATED ORAL at 07:27

## 2021-12-07 RX ADMIN — Medication 8: at 17:54

## 2021-12-07 RX ADMIN — Medication 81 MILLIGRAM(S): at 12:41

## 2021-12-07 RX ADMIN — MAGNESIUM HYDROXIDE 30 MILLILITER(S): 400 TABLET, CHEWABLE ORAL at 11:15

## 2021-12-07 RX ADMIN — Medication 1 TABLET(S): at 12:41

## 2021-12-07 RX ADMIN — Medication 30 MILLILITER(S): at 11:14

## 2021-12-07 RX ADMIN — ENOXAPARIN SODIUM 40 MILLIGRAM(S): 100 INJECTION SUBCUTANEOUS at 22:47

## 2021-12-07 RX ADMIN — Medication 2: at 23:41

## 2021-12-07 RX ADMIN — Medication 1 TABLET(S): at 07:27

## 2021-12-07 RX ADMIN — INSULIN GLARGINE 15 UNIT(S): 100 INJECTION, SOLUTION SUBCUTANEOUS at 23:42

## 2021-12-07 RX ADMIN — Medication 6 UNIT(S): at 17:54

## 2021-12-07 RX ADMIN — Medication 2: at 08:26

## 2021-12-07 RX ADMIN — CARVEDILOL PHOSPHATE 12.5 MILLIGRAM(S): 80 CAPSULE, EXTENDED RELEASE ORAL at 07:27

## 2021-12-07 RX ADMIN — CARVEDILOL PHOSPHATE 12.5 MILLIGRAM(S): 80 CAPSULE, EXTENDED RELEASE ORAL at 17:52

## 2021-12-07 RX ADMIN — Medication 3 UNIT(S): at 12:39

## 2021-12-07 RX ADMIN — Medication 6: at 12:38

## 2021-12-07 NOTE — PROGRESS NOTE ADULT - ASSESSMENT
66 year old female, with Hypertension, uncontrolled Type-2 DM (started on insulin 2 weeks ago, A1c 10.9% 12/2021) complicated by neuropathy and gastroparesis, Dyslipidemia and Osteoporosis, presented to the ED secondary to persistent chest pain.      # Stress hyperglycemia   # Type 2 DM, uncontrolled   # gastroparesis, controlled   A1c 10.9%, a1c goal < 7%   pt has a good kidney function: GFR/Cr 105/0.46.  She was recently started on insulin, unsure why PO was stopped and only Lantus was started.  Home medication: Lantus 20 U HS.  Pt was tolerating PO medications.  Also, pt reports vomiting was very infrequent.       Not in DKA on admission   - BG remains uncontrolled   - , c/w Lantus 15 U HS   - Increase Lispro 5 U TID with meals; do not give scheduled prandial insulin if patient is NPO  - c/w moderate SS with meals and low dose HS   - Pt has been extremely worried about food intake to the point she is skipping dinner, would consult dietician   - please monitor fingerstick blood glucose at least 4 times a day to avoid insulin toxicity, hypoglycemia; Blood glucose target while hospitalized 140-180 mg/dL  - Carbohydrate controlled diet, no concentrated sweets  - Counseled on need for medication adherence to avoid new complications.     Hypertension  - BP goal <130/80, tx as per primary team     DM Discharge planning  Please contact diabetes team prior to patient's discharge for finalized regimen. Please allow 24-48 hours for discharge planning.   Possible discharge on basal insulin and PO medications      Morelia Shelby MD  Attending Physician   Department of Endocrinology, Diabetes and Metabolism   Cell: [phone]    If before 9AM or after 5PM, or on weekends/holidays, please call the Endocrine answering service for assistance ([phone]).  For nonurgent matters, please email [email or web address] for assistance.    66 year old female, with Hypertension, uncontrolled Type-2 DM (started on insulin 2 weeks ago, A1c 10.9% 12/2021) complicated by neuropathy and gastroparesis, Dyslipidemia and Osteoporosis, presented to the ED secondary to persistent chest pain.      # Stress hyperglycemia   # Type 2 DM, uncontrolled   # gastroparesis, controlled   A1c 10.9%, a1c goal < 7%   pt has a good kidney function: GFR/Cr 105/0.46.  She was recently started on insulin, unsure why PO was stopped and only Lantus was started.  Home medication: Lantus 20 U HS.  Pt was tolerating PO medications.  Also, pt reports vomiting was very infrequent.       Not in DKA on admission   - BG remains uncontrolled   - , c/w Lantus 15 U HS   - Increase Lispro 6 U TID with meals; do not give scheduled prandial insulin if patient is NPO  - c/w moderate SS with meals and low dose HS   - Pt has been extremely worried about food intake to the point she is skipping dinner, dietician consult pending    - please monitor fingerstick blood glucose at least 4 times a day to avoid insulin toxicity, hypoglycemia; Blood glucose target while hospitalized 140-180 mg/dL  - Carbohydrate controlled diet, no concentrated sweets  - Counseled on need for medication adherence to avoid new complications.   - pt is worried about insulin requirement, her concerns were acknowledged and answered all her questions.      Hypertension  - BP goal <130/80, tx as per primary team     DM Discharge planning  Please contact diabetes team prior to patient's discharge for finalized regimen. Please allow 24-48 hours for discharge planning.   Possible discharge on basal insulin and PO medications      Morelia Shelby MD  Attending Physician   Department of Endocrinology, Diabetes and Metabolism   Cell: [phone]    If before 9AM or after 5PM, or on weekends/holidays, please call the Endocrine answering service for assistance ([phone]).  For nonurgent matters, please email [email or web address] for assistance.   >30 minutes spent

## 2021-12-07 NOTE — PROGRESS NOTE ADULT - ASSESSMENT
66 year old female, with past history significant for Hypertension, Type-2 DM (started on insulin 2 weeks ago), Dyslipidemia and Osteoporosis, presented to the ED secondary to persistent chest pain.    EKG: NSR TWI v1-v3    1. Chest pain  -trops negative  -CXR clear  -echo pending--expedited  -will plan for [facility] tomorrow   -c/w asa and atorvastatin    2. HTN  -improving  -c/w losaratan 50mg daily and coreg 12.5mg BID  -continue to monitor BP     3. DM  -c/w insulin    4. DVT prophylaxis  -lovenox subq

## 2021-12-08 PROBLEM — Z00.00 ENCOUNTER FOR PREVENTIVE HEALTH EXAMINATION: Noted: 2021-12-08

## 2021-12-08 LAB
ANION GAP SERPL CALC-SCNC: 10 MMOL/L — SIGNIFICANT CHANGE UP (ref 7–14)
BUN SERPL-MCNC: 14 MG/DL — SIGNIFICANT CHANGE UP (ref 7–23)
CALCIUM SERPL-MCNC: 9.1 MG/DL — SIGNIFICANT CHANGE UP (ref 8.4–10.5)
CHLORIDE SERPL-SCNC: 103 MMOL/L — SIGNIFICANT CHANGE UP (ref 98–107)
CO2 SERPL-SCNC: 25 MMOL/L — SIGNIFICANT CHANGE UP (ref 22–31)
CREAT SERPL-MCNC: 0.45 MG/DL — LOW (ref 0.5–1.3)
GLUCOSE SERPL-MCNC: 206 MG/DL — HIGH (ref 70–99)
HCT VFR BLD CALC: 40.6 % — SIGNIFICANT CHANGE UP (ref 34.5–45)
HGB BLD-MCNC: 13.3 G/DL — SIGNIFICANT CHANGE UP (ref 11.5–15.5)
MAGNESIUM SERPL-MCNC: 2 MG/DL — SIGNIFICANT CHANGE UP (ref 1.6–2.6)
MCHC RBC-ENTMCNC: 30.2 PG — SIGNIFICANT CHANGE UP (ref 27–34)
MCHC RBC-ENTMCNC: 32.8 GM/DL — SIGNIFICANT CHANGE UP (ref 32–36)
MCV RBC AUTO: 92.3 FL — SIGNIFICANT CHANGE UP (ref 80–100)
NRBC # BLD: 0 /100 WBCS — SIGNIFICANT CHANGE UP
NRBC # FLD: 0 K/UL — SIGNIFICANT CHANGE UP
PHOSPHATE SERPL-MCNC: 3.8 MG/DL — SIGNIFICANT CHANGE UP (ref 2.5–4.5)
PLATELET # BLD AUTO: 246 K/UL — SIGNIFICANT CHANGE UP (ref 150–400)
POTASSIUM SERPL-MCNC: 4 MMOL/L — SIGNIFICANT CHANGE UP (ref 3.5–5.3)
POTASSIUM SERPL-SCNC: 4 MMOL/L — SIGNIFICANT CHANGE UP (ref 3.5–5.3)
RBC # BLD: 4.4 M/UL — SIGNIFICANT CHANGE UP (ref 3.8–5.2)
RBC # FLD: 13 % — SIGNIFICANT CHANGE UP (ref 10.3–14.5)
SODIUM SERPL-SCNC: 138 MMOL/L — SIGNIFICANT CHANGE UP (ref 135–145)
WBC # BLD: 5.53 K/UL — SIGNIFICANT CHANGE UP (ref 3.8–10.5)
WBC # FLD AUTO: 5.53 K/UL — SIGNIFICANT CHANGE UP (ref 3.8–10.5)

## 2021-12-08 PROCEDURE — 93010 ELECTROCARDIOGRAM REPORT: CPT

## 2021-12-08 RX ORDER — TICAGRELOR 90 MG/1
1 TABLET ORAL
Qty: 60 | Refills: 0
Start: 2021-12-08 | End: 2022-01-06

## 2021-12-08 RX ORDER — LOSARTAN POTASSIUM 100 MG/1
100 TABLET, FILM COATED ORAL DAILY
Refills: 0 | Status: DISCONTINUED | OUTPATIENT
Start: 2021-12-09 | End: 2021-12-10

## 2021-12-08 RX ORDER — TICAGRELOR 90 MG/1
90 TABLET ORAL EVERY 12 HOURS
Refills: 0 | Status: DISCONTINUED | OUTPATIENT
Start: 2021-12-08 | End: 2021-12-10

## 2021-12-08 RX ORDER — INSULIN GLARGINE 100 [IU]/ML
18 INJECTION, SOLUTION SUBCUTANEOUS AT BEDTIME
Refills: 0 | Status: DISCONTINUED | OUTPATIENT
Start: 2021-12-08 | End: 2021-12-09

## 2021-12-08 RX ORDER — ATORVASTATIN CALCIUM 80 MG/1
80 TABLET, FILM COATED ORAL AT BEDTIME
Refills: 0 | Status: DISCONTINUED | OUTPATIENT
Start: 2021-12-08 | End: 2021-12-10

## 2021-12-08 RX ADMIN — CARVEDILOL PHOSPHATE 12.5 MILLIGRAM(S): 80 CAPSULE, EXTENDED RELEASE ORAL at 19:08

## 2021-12-08 RX ADMIN — INSULIN GLARGINE 18 UNIT(S): 100 INJECTION, SOLUTION SUBCUTANEOUS at 21:15

## 2021-12-08 RX ADMIN — Medication 6 UNIT(S): at 18:15

## 2021-12-08 RX ADMIN — Medication 1 TABLET(S): at 19:11

## 2021-12-08 RX ADMIN — Medication 2: at 18:15

## 2021-12-08 RX ADMIN — Medication 1 TABLET(S): at 05:41

## 2021-12-08 RX ADMIN — Medication 4: at 08:11

## 2021-12-08 RX ADMIN — CARVEDILOL PHOSPHATE 12.5 MILLIGRAM(S): 80 CAPSULE, EXTENDED RELEASE ORAL at 05:40

## 2021-12-08 RX ADMIN — TICAGRELOR 90 MILLIGRAM(S): 90 TABLET ORAL at 21:47

## 2021-12-08 RX ADMIN — ATORVASTATIN CALCIUM 80 MILLIGRAM(S): 80 TABLET, FILM COATED ORAL at 21:18

## 2021-12-08 RX ADMIN — LOSARTAN POTASSIUM 50 MILLIGRAM(S): 100 TABLET, FILM COATED ORAL at 05:41

## 2021-12-08 RX ADMIN — Medication 1 TABLET(S): at 19:08

## 2021-12-08 RX ADMIN — ENOXAPARIN SODIUM 40 MILLIGRAM(S): 100 INJECTION SUBCUTANEOUS at 21:19

## 2021-12-08 RX ADMIN — Medication 2: at 21:18

## 2021-12-08 NOTE — PROGRESS NOTE ADULT - ASSESSMENT
66 year old female, with past history significant for Hypertension, Type-2 DM (started on insulin 2 weeks ago), Dyslipidemia and Osteoporosis, presented to the ED secondary to persistent chest pain.    EKG: NSR TWI v1-v3    1. Chest pain  -trops negative  -CXR clear  -echo with mild-mod AR, normal LV function, no WMA, mild diastolic dysfunction  -s/p LHC with prox LAD 90% s/p USMAN  -c/w asa, brilinta and atorvastatin    2. HTN  -improving  -c/w  coreg 12.5mg BID  -BP elevated during cath, losartan increased to 100mg daily   -continue to monitor BP     3. DM  -c/w insulin    4. DVT prophylaxis  -lovenox subq

## 2021-12-08 NOTE — PROVIDER CONTACT NOTE (MEDICATION) - RECOMMENDATIONS
VIVO test script, awaiting Co-pay info VIVO test script - pharmacy called and advised patient fully covered for Brilinta

## 2021-12-09 ENCOUNTER — TRANSCRIPTION ENCOUNTER (OUTPATIENT)
Age: 66
End: 2021-12-09

## 2021-12-09 DIAGNOSIS — E78.5 HYPERLIPIDEMIA, UNSPECIFIED: ICD-10-CM

## 2021-12-09 DIAGNOSIS — I10 ESSENTIAL (PRIMARY) HYPERTENSION: ICD-10-CM

## 2021-12-09 LAB
ANION GAP SERPL CALC-SCNC: 9 MMOL/L — SIGNIFICANT CHANGE UP (ref 7–14)
BUN SERPL-MCNC: 14 MG/DL — SIGNIFICANT CHANGE UP (ref 7–23)
CALCIUM SERPL-MCNC: 8.7 MG/DL — SIGNIFICANT CHANGE UP (ref 8.4–10.5)
CHLORIDE SERPL-SCNC: 106 MMOL/L — SIGNIFICANT CHANGE UP (ref 98–107)
CO2 SERPL-SCNC: 24 MMOL/L — SIGNIFICANT CHANGE UP (ref 22–31)
CREAT SERPL-MCNC: 0.46 MG/DL — LOW (ref 0.5–1.3)
GLUCOSE SERPL-MCNC: 160 MG/DL — HIGH (ref 70–99)
HCT VFR BLD CALC: 37.2 % — SIGNIFICANT CHANGE UP (ref 34.5–45)
HGB BLD-MCNC: 12.9 G/DL — SIGNIFICANT CHANGE UP (ref 11.5–15.5)
MAGNESIUM SERPL-MCNC: 1.8 MG/DL — SIGNIFICANT CHANGE UP (ref 1.6–2.6)
MCHC RBC-ENTMCNC: 30.6 PG — SIGNIFICANT CHANGE UP (ref 27–34)
MCHC RBC-ENTMCNC: 34.7 GM/DL — SIGNIFICANT CHANGE UP (ref 32–36)
MCV RBC AUTO: 88.4 FL — SIGNIFICANT CHANGE UP (ref 80–100)
NRBC # BLD: 0 /100 WBCS — SIGNIFICANT CHANGE UP
NRBC # FLD: 0 K/UL — SIGNIFICANT CHANGE UP
PHOSPHATE SERPL-MCNC: 4.4 MG/DL — SIGNIFICANT CHANGE UP (ref 2.5–4.5)
PLATELET # BLD AUTO: 245 K/UL — SIGNIFICANT CHANGE UP (ref 150–400)
POTASSIUM SERPL-MCNC: 4 MMOL/L — SIGNIFICANT CHANGE UP (ref 3.5–5.3)
POTASSIUM SERPL-SCNC: 4 MMOL/L — SIGNIFICANT CHANGE UP (ref 3.5–5.3)
RBC # BLD: 4.21 M/UL — SIGNIFICANT CHANGE UP (ref 3.8–5.2)
RBC # FLD: 13 % — SIGNIFICANT CHANGE UP (ref 10.3–14.5)
SODIUM SERPL-SCNC: 139 MMOL/L — SIGNIFICANT CHANGE UP (ref 135–145)
WBC # BLD: 7.84 K/UL — SIGNIFICANT CHANGE UP (ref 3.8–10.5)
WBC # FLD AUTO: 7.84 K/UL — SIGNIFICANT CHANGE UP (ref 3.8–10.5)

## 2021-12-09 PROCEDURE — 99233 SBSQ HOSP IP/OBS HIGH 50: CPT

## 2021-12-09 RX ORDER — ENOXAPARIN SODIUM 100 MG/ML
21 INJECTION SUBCUTANEOUS
Qty: 0 | Refills: 0 | DISCHARGE

## 2021-12-09 RX ORDER — INSULIN GLARGINE 100 [IU]/ML
21 INJECTION, SOLUTION SUBCUTANEOUS
Qty: 0 | Refills: 0 | DISCHARGE
Start: 2021-12-09

## 2021-12-09 RX ORDER — INSULIN LISPRO 100/ML
7 VIAL (ML) SUBCUTANEOUS
Qty: 0 | Refills: 0 | DISCHARGE
Start: 2021-12-09

## 2021-12-09 RX ORDER — LOSARTAN POTASSIUM 100 MG/1
1 TABLET, FILM COATED ORAL
Qty: 30 | Refills: 0
Start: 2021-12-09 | End: 2022-01-07

## 2021-12-09 RX ORDER — INSULIN GLARGINE 100 [IU]/ML
18 INJECTION, SOLUTION SUBCUTANEOUS
Qty: 0 | Refills: 0 | DISCHARGE
Start: 2021-12-09

## 2021-12-09 RX ORDER — INSULIN GLARGINE 100 [IU]/ML
21 INJECTION, SOLUTION SUBCUTANEOUS
Qty: 1 | Refills: 0
Start: 2021-12-09 | End: 2022-01-07

## 2021-12-09 RX ORDER — ATORVASTATIN CALCIUM 80 MG/1
1 TABLET, FILM COATED ORAL
Qty: 0 | Refills: 0 | DISCHARGE

## 2021-12-09 RX ORDER — SENNA PLUS 8.6 MG/1
2 TABLET ORAL AT BEDTIME
Refills: 0 | Status: DISCONTINUED | OUTPATIENT
Start: 2021-12-09 | End: 2021-12-10

## 2021-12-09 RX ORDER — INSULIN LISPRO 100/ML
7 VIAL (ML) SUBCUTANEOUS
Qty: 1 | Refills: 0
Start: 2021-12-09 | End: 2022-01-07

## 2021-12-09 RX ORDER — INSULIN GLARGINE 100 [IU]/ML
20 INJECTION, SOLUTION SUBCUTANEOUS
Qty: 0 | Refills: 0 | DISCHARGE

## 2021-12-09 RX ORDER — TICAGRELOR 90 MG/1
1 TABLET ORAL
Qty: 60 | Refills: 0
Start: 2021-12-09 | End: 2022-01-07

## 2021-12-09 RX ORDER — POLYETHYLENE GLYCOL 3350 17 G/17G
17 POWDER, FOR SOLUTION ORAL ONCE
Refills: 0 | Status: COMPLETED | OUTPATIENT
Start: 2021-12-09 | End: 2021-12-09

## 2021-12-09 RX ORDER — LOSARTAN POTASSIUM 100 MG/1
1 TABLET, FILM COATED ORAL
Qty: 0 | Refills: 0 | DISCHARGE

## 2021-12-09 RX ORDER — INSULIN LISPRO 100/ML
6 VIAL (ML) SUBCUTANEOUS
Qty: 0 | Refills: 0 | DISCHARGE
Start: 2021-12-09

## 2021-12-09 RX ORDER — ATORVASTATIN CALCIUM 80 MG/1
1 TABLET, FILM COATED ORAL
Qty: 30 | Refills: 0
Start: 2021-12-09 | End: 2022-01-07

## 2021-12-09 RX ORDER — ATORVASTATIN CALCIUM 80 MG/1
1 TABLET, FILM COATED ORAL
Qty: 0 | Refills: 0 | DISCHARGE
Start: 2021-12-09

## 2021-12-09 RX ORDER — INSULIN GLARGINE 100 [IU]/ML
21 INJECTION, SOLUTION SUBCUTANEOUS AT BEDTIME
Refills: 0 | Status: DISCONTINUED | OUTPATIENT
Start: 2021-12-09 | End: 2021-12-10

## 2021-12-09 RX ORDER — INSULIN LISPRO 100/ML
7 VIAL (ML) SUBCUTANEOUS
Qty: 0 | Refills: 0 | DISCHARGE

## 2021-12-09 RX ORDER — LOSARTAN POTASSIUM 100 MG/1
1 TABLET, FILM COATED ORAL
Qty: 0 | Refills: 0 | DISCHARGE
Start: 2021-12-09

## 2021-12-09 RX ORDER — ENOXAPARIN SODIUM 100 MG/ML
21 INJECTION SUBCUTANEOUS
Qty: 1 | Refills: 0
Start: 2021-12-09 | End: 2022-01-07

## 2021-12-09 RX ORDER — MAGNESIUM HYDROXIDE 400 MG/1
0 TABLET, CHEWABLE ORAL
Qty: 0 | Refills: 0 | DISCHARGE

## 2021-12-09 RX ORDER — TICAGRELOR 90 MG/1
1 TABLET ORAL
Qty: 0 | Refills: 0 | DISCHARGE
Start: 2021-12-09

## 2021-12-09 RX ORDER — MAGNESIUM HYDROXIDE 400 MG/1
30 TABLET, CHEWABLE ORAL
Qty: 0 | Refills: 0 | DISCHARGE
Start: 2021-12-09

## 2021-12-09 RX ORDER — INSULIN LISPRO 100/ML
7 VIAL (ML) SUBCUTANEOUS
Refills: 0 | Status: DISCONTINUED | OUTPATIENT
Start: 2021-12-09 | End: 2021-12-10

## 2021-12-09 RX ADMIN — Medication 650 MILLIGRAM(S): at 12:36

## 2021-12-09 RX ADMIN — Medication 7 UNIT(S): at 17:17

## 2021-12-09 RX ADMIN — ENOXAPARIN SODIUM 40 MILLIGRAM(S): 100 INJECTION SUBCUTANEOUS at 22:08

## 2021-12-09 RX ADMIN — CARVEDILOL PHOSPHATE 12.5 MILLIGRAM(S): 80 CAPSULE, EXTENDED RELEASE ORAL at 05:39

## 2021-12-09 RX ADMIN — INSULIN GLARGINE 21 UNIT(S): 100 INJECTION, SOLUTION SUBCUTANEOUS at 22:08

## 2021-12-09 RX ADMIN — TICAGRELOR 90 MILLIGRAM(S): 90 TABLET ORAL at 09:05

## 2021-12-09 RX ADMIN — Medication 1 TABLET(S): at 05:38

## 2021-12-09 RX ADMIN — Medication 650 MILLIGRAM(S): at 22:08

## 2021-12-09 RX ADMIN — Medication 650 MILLIGRAM(S): at 02:30

## 2021-12-09 RX ADMIN — ATORVASTATIN CALCIUM 80 MILLIGRAM(S): 80 TABLET, FILM COATED ORAL at 22:08

## 2021-12-09 RX ADMIN — Medication 1 TABLET(S): at 11:29

## 2021-12-09 RX ADMIN — CARVEDILOL PHOSPHATE 12.5 MILLIGRAM(S): 80 CAPSULE, EXTENDED RELEASE ORAL at 17:04

## 2021-12-09 RX ADMIN — Medication 6 UNIT(S): at 09:05

## 2021-12-09 RX ADMIN — Medication 4: at 17:18

## 2021-12-09 RX ADMIN — Medication 1 TABLET(S): at 17:03

## 2021-12-09 RX ADMIN — POLYETHYLENE GLYCOL 3350 17 GRAM(S): 17 POWDER, FOR SOLUTION ORAL at 11:27

## 2021-12-09 RX ADMIN — MAGNESIUM HYDROXIDE 30 MILLILITER(S): 400 TABLET, CHEWABLE ORAL at 10:16

## 2021-12-09 RX ADMIN — Medication 81 MILLIGRAM(S): at 11:28

## 2021-12-09 RX ADMIN — Medication 4: at 09:04

## 2021-12-09 RX ADMIN — LOSARTAN POTASSIUM 100 MILLIGRAM(S): 100 TABLET, FILM COATED ORAL at 06:15

## 2021-12-09 RX ADMIN — Medication 3 MILLIGRAM(S): at 00:02

## 2021-12-09 RX ADMIN — TICAGRELOR 90 MILLIGRAM(S): 90 TABLET ORAL at 18:24

## 2021-12-09 RX ADMIN — Medication 2: at 12:43

## 2021-12-09 RX ADMIN — Medication 6 UNIT(S): at 12:43

## 2021-12-09 NOTE — DISCHARGE NOTE PROVIDER - NSDCMRMEDTOKEN_GEN_ALL_CORE_FT
aspirin 81 mg oral tablet: 1 tab(s) orally once a day  atorvastatin 40 mg oral tablet: 1 tab(s) orally once a day  Brilinta (ticagrelor) 90 mg oral tablet: 1 tab(s) orally 2 times a day   carvedilol 12.5 mg oral tablet: 1 tab(s) orally 2 times a day  docusate sodium 100 mg oral capsule: 1 cap(s) orally 2 times a day, As Needed  Lantus 100 units/mL subcutaneous solution: 20 unit(s) subcutaneous once a day (at bedtime)  losartan 50 mg oral tablet: 1 tab(s) orally 2 times a day  Milk of Magnesia:   Multiple Vitamins oral tablet: 1 tab(s) orally once a day  Oyster Shell Calcium with Vitamin D 500 mg-200 intl units oral tablet: 1 tab(s) orally 2 times a day   Admelog 100 units/mL injectable solution: 7 unit(s) injectable 3 times a day   aspirin 81 mg oral tablet: 1 tab(s) orally once a day  atorvastatin 80 mg oral tablet: 1 tab(s) orally once a day (at bedtime)  carvedilol 12.5 mg oral tablet: 1 tab(s) orally 2 times a day  docusate sodium 100 mg oral capsule: 1 cap(s) orally 2 times a day, As Needed  glucometer: 1 application subcutaneous 4 times a day   insulin glargine: 21 unit(s) subcutaneous once a day (at bedtime)  insulin lispro 100 units/mL injectable solution: 7 unit(s) subcutaneous 3 times a day (before meals)  Lantus 100 units/mL subcutaneous solution: 21 unit(s) subcutaneous once a day (at bedtime)   losartan 100 mg oral tablet: 1 tab(s) orally once a day  magnesium hydroxide 8% oral suspension: 30 milliliter(s) orally once a day, As needed, Constipation  Multiple Vitamins oral tablet: 1 tab(s) orally once a day  Oyster Shell Calcium with Vitamin D 500 mg-200 intl units oral tablet: 1 tab(s) orally 2 times a day  ticagrelor 90 mg oral tablet: 1 tab(s) orally every 12 hours   Admelog 100 units/mL injectable solution: 7 unit(s) injectable 3 times a day   aspirin 81 mg oral tablet: 1 tab(s) orally once a day  atorvastatin 80 mg oral tablet: 1 tab(s) orally once a day (at bedtime)  carvedilol 12.5 mg oral tablet: 1 tab(s) orally 2 times a day  docusate sodium 100 mg oral capsule: 1 cap(s) orally 2 times a day, As Needed  glucometer: 1 application subcutaneous 4 times a day   Lantus 100 units/mL subcutaneous solution: 21 unit(s) subcutaneous once a day (at bedtime)   losartan 100 mg oral tablet: 1 tab(s) orally once a day  magnesium hydroxide 8% oral suspension: 30 milliliter(s) orally once a day, As needed, Constipation  Multiple Vitamins oral tablet: 1 tab(s) orally once a day  Oyster Shell Calcium with Vitamin D 500 mg-200 intl units oral tablet: 1 tab(s) orally 2 times a day  ticagrelor 90 mg oral tablet: 1 tab(s) orally every 12 hours   aspirin 81 mg oral tablet: 1 tab(s) orally once a day  atorvastatin 80 mg oral tablet: 1 tab(s) orally once a day (at bedtime)  carvedilol 12.5 mg oral tablet: 1 tab(s) orally 2 times a day  docusate sodium 100 mg oral capsule: 1 cap(s) orally 2 times a day, As Needed  glucometer: 1 application subcutaneous 4 times a day   Glucometer Test Strips: 1 application subcutaneous 4 times a day  HumaLOG KwikPen 100 units/mL injectable solution: 7 unit(s) injectable 3 times a day (before meals)  Insulin Pen Needles: 1 application subcutaneous 4 times a day  Lancets: 1 application subcutaneous 4 times a day  Lantus Solostar Pen 100 units/mL subcutaneous solution: 21 unit(s) subcutaneous once a day (at bedtime)  losartan 100 mg oral tablet: 1 tab(s) orally once a day  magnesium hydroxide 8% oral suspension: 30 milliliter(s) orally once a day, As needed, Constipation  Multiple Vitamins oral tablet: 1 tab(s) orally once a day  Oyster Shell Calcium with Vitamin D 500 mg-200 intl units oral tablet: 1 tab(s) orally 2 times a day  ticagrelor 90 mg oral tablet: 1 tab(s) orally every 12 hours   aspirin 81 mg oral tablet: 1 tab(s) orally once a day  atorvastatin 80 mg oral tablet: 1 tab(s) orally once a day (at bedtime)  carvedilol 12.5 mg oral tablet: 1 tab(s) orally 2 times a day  docusate sodium 100 mg oral capsule: 1 cap(s) orally 2 times a day, As Needed  glucometer: 1 application subcutaneous 4 times a day   Glucometer Test Strips: 1 application subcutaneous 4 times a day   HumaLOG KwikPen 100 units/mL injectable solution: 7 unit(s) injectable 3 times a day (before meals)  Insulin Pen Needles: 1 application subcutaneous 4 times a day   Lancets: 1 application subcutaneous 4 times a day   Lantus Solostar Pen 100 units/mL subcutaneous solution: 21 unit(s) subcutaneous once a day (at bedtime)  losartan 100 mg oral tablet: 1 tab(s) orally once a day  magnesium hydroxide 8% oral suspension: 30 milliliter(s) orally once a day, As needed, Constipation  Multiple Vitamins oral tablet: 1 tab(s) orally once a day  Oyster Shell Calcium with Vitamin D 500 mg-200 intl units oral tablet: 1 tab(s) orally 2 times a day  ticagrelor 90 mg oral tablet: 1 tab(s) orally every 12 hours   aspirin 81 mg oral tablet: 1 tab(s) orally once a day  atorvastatin 80 mg oral tablet: 1 tab(s) orally once a day (at bedtime)  carvedilol 12.5 mg oral tablet: 1 tab(s) orally 2 times a day  docusate sodium 100 mg oral capsule: 1 cap(s) orally 2 times a day, As Needed  glucometer: 1 application subcutaneous 4 times a day   Glucometer Test Strips: 1 application subcutaneous 4 times a day   HumaLOG KwikPen 100 units/mL injectable solution: 7 unit(s) injectable 3 times a day (before meals)  Insulin Pen Needles: 1 application subcutaneous 4 times a day   Lancets: 1 application subcutaneous 4 times a day   Lantus Solostar Pen 100 units/mL subcutaneous solution: 21 unit(s) subcutaneous once a day (at bedtime)  losartan 100 mg oral tablet: 1 tab(s) orally once a day  magnesium hydroxide 8% oral suspension: 30 milliliter(s) orally once a day, As needed, Constipation  Multiple Vitamins oral tablet: 1 tab(s) orally once a day  Oyster Shell Calcium with Vitamin D 500 mg-200 intl units oral tablet: 1 tab(s) orally 2 times a day  ticagrelor 90 mg oral tablet: 1 tab(s) orally every 12 hours  ticagrelor 90 mg oral tablet: 1 tab(s) orally every 12 hours   aspirin 81 mg oral tablet: 1 tab(s) orally once a day  atorvastatin 80 mg oral tablet: 1 tab(s) orally once a day (at bedtime)  carvedilol 12.5 mg oral tablet: 1 tab(s) orally 2 times a day  docusate sodium 100 mg oral capsule: 1 cap(s) orally 2 times a day, As Needed  glucometer: 1 application subcutaneous 4 times a day   Glucometer Test Strips: 1 application subcutaneous 4 times a day   HumaLOG KwikPen 100 units/mL injectable solution: 7 unit(s) injectable 3 times a day (before meals)  Insulin needles: application subcutaneous once a day   Lancets: 1 application subcutaneous 4 times a day   Lantus Solostar Pen 100 units/mL subcutaneous solution: 21 unit(s) subcutaneous once a day (at bedtime)  losartan 100 mg oral tablet: 1 tab(s) orally once a day  magnesium hydroxide 8% oral suspension: 30 milliliter(s) orally once a day, As needed, Constipation  Multiple Vitamins oral tablet: 1 tab(s) orally once a day  Oyster Shell Calcium with Vitamin D 500 mg-200 intl units oral tablet: 1 tab(s) orally 2 times a day  ticagrelor 90 mg oral tablet: 1 tab(s) orally every 12 hours  ticagrelor 90 mg oral tablet: 1 tab(s) orally every 12 hours

## 2021-12-09 NOTE — DIETITIAN INITIAL EVALUATION ADULT. - PERTINENT MEDS FT
MEDICATIONS  (STANDING):  aspirin enteric coated 81 milliGRAM(s) Oral daily  atorvastatin 80 milliGRAM(s) Oral at bedtime  calcium carbonate 1250 mG  + Vitamin D (OsCal 500 + D) 1 Tablet(s) Oral two times a day  carvedilol 12.5 milliGRAM(s) Oral every 12 hours  dextrose 40% Gel 15 Gram(s) Oral once  dextrose 5%. 1000 milliLiter(s) (50 mL/Hr) IV Continuous <Continuous>  dextrose 5%. 1000 milliLiter(s) (100 mL/Hr) IV Continuous <Continuous>  dextrose 50% Injectable 25 Gram(s) IV Push once  dextrose 50% Injectable 12.5 Gram(s) IV Push once  dextrose 50% Injectable 25 Gram(s) IV Push once  enoxaparin Injectable 40 milliGRAM(s) SubCutaneous at bedtime  glucagon  Injectable 1 milliGRAM(s) IntraMuscular once  insulin glargine Injectable (LANTUS) 18 Unit(s) SubCutaneous at bedtime  insulin lispro (ADMELOG) corrective regimen sliding scale   SubCutaneous three times a day before meals  insulin lispro (ADMELOG) corrective regimen sliding scale   SubCutaneous at bedtime  insulin lispro Injectable (ADMELOG) 6 Unit(s) SubCutaneous three times a day before meals  lactated ringers. 1000 milliLiter(s) (100 mL/Hr) IV Continuous <Continuous>  losartan 100 milliGRAM(s) Oral daily  multivitamin 1 Tablet(s) Oral daily  senna 2 Tablet(s) Oral at bedtime  ticagrelor 90 milliGRAM(s) Oral every 12 hours

## 2021-12-09 NOTE — DIETITIAN INITIAL EVALUATION ADULT. - CHIEF COMPLAINT
The patient is a 66 year old female, with past history significant for Hypertension, Type-2 DM (started on insulin 2 weeks ago), Dyslipidemia and Osteoporosis, presented to the ED secondary to persistent chest pain.

## 2021-12-09 NOTE — DISCHARGE NOTE PROVIDER - CARE PROVIDER_API CALL
58
Jean-Pierre Santos (MD)  Cardiovascular Disease; Internal Medicine  [address] [place] [id number]  Phone: [phone]  Fax: [phone]  Follow Up Time:     Dr. Mauro Mendoza,   Cardiologist  [address] [place] [address]  Phone: [phone]  Fax: (   )    -  Follow Up Time:     Morelia Shelby)  Internal Medicine  [address] [place] [address]  Phone: [phone]  Fax: [phone]  Follow Up Time:

## 2021-12-09 NOTE — DISCHARGE NOTE PROVIDER - NSDCCPCAREPLAN_GEN_ALL_CORE_FT
PRINCIPAL DISCHARGE DIAGNOSIS  Diagnosis: Chest pain  Assessment and Plan of Treatment: To be asymptomatic, to reduce risks factors such as hypertension, diabetes and hyperlipidemia to lower the risk of blood clots formation; and to prevent complications of coronary artery disease such as worsening chest pain, heart attack and death.   Continue aspirin and Brillinta, do not stop unless instructed by your physician.  Continue low salt, fat, cholesterol and carbohydrate diet. Follow up with cardiologist and primary care physician's routine appointment.      SECONDARY DISCHARGE DIAGNOSES  Diagnosis: Hypertension  Assessment and Plan of Treatment: To maintain a normal blood pressure to prevent heart attack, stroke and renal failure.   Low sodium and fat diet, continue anti-hypertensive medications, and follow up with primary care physician.    Diagnosis: Diabetes mellitus  Assessment and Plan of Treatment: To maintain a normal blood glucose level and HgA1C level < 5.7 and to prevent diabetic complications such as uncontrolled diabetes, diabetic coma, blindness, renal failure, and amputations.   Monitor finger sticks pre-meal and bedtime, low salt, fat and carbohydrate diet, minimize glucose intake.  Exercise daily for at least 30 minutes and weight loss.  Follow up with primary care physician and endocrinologist for routine Hemoglobin A1C checks and management.  Follow up with your ophthalmologist for routine yearly vision exams.    Diagnosis: Hyperlipidemia  Assessment and Plan of Treatment: To maintain normal cholesterol levels to prevent stroke, coronary artery disease, peripheral vascular disease and heart attacks.   Low fat diet, exercise daily and continue current medications. Follow up with primary care physician and cardiologist for management.

## 2021-12-09 NOTE — DISCHARGE NOTE PROVIDER - NSDCFUADDAPPT_GEN_ALL_CORE_FT
Follow up with your primary care doctor and Cardiologist within two weeks after discharge. Follow up with Endocrinology as well.

## 2021-12-09 NOTE — DIETITIAN INITIAL EVALUATION ADULT. - OTHER INFO
Patient tolerating current diet. Patient denies GI distress(nausea/vomiting/diarrhea/constipation). States no difficulty chewing/swallowing. Daughter states patient's weight has been stable x3-4 years at 45.5kg.  Patient with a history of Type 2 Diabetes. A1c of 10.9% indicates poor glycemic control. Educated and provided patient and daughter with materials on consistent carbohydrate diet and Type 2 Diabetes nutrition therapy. Reviewed carbohydrate sources, creating balanced meals by pairing protein with carbohydrates, and nutrition facts label reading. Also educated on gastroparesis. Counseled on importance of small frequent meals and limiting fat intake. Daughter inquired about appetite stimulants for patient, RD will relay request to team.

## 2021-12-09 NOTE — DIETITIAN INITIAL EVALUATION ADULT. - ORAL INTAKE PTA/DIET HISTORY
Patient reports poor appetite PTA, but forces herself to eat 3 meals per day. Diet recall consists of 2% milk, eggs, beans, chicken, vegetables. Drinks only water. Reports use of glucerna supplements at home. NKFA.

## 2021-12-09 NOTE — CHART NOTE - NSCHARTNOTEFT_GEN_A_CORE
66 year old female, with Hypertension, uncontrolled Type-2 DM (started on insulin 2 weeks ago, A1c 10.9% 12/2021) complicated by neuropathy and gastroparesis, Dyslipidemia and Osteoporosis, presented to the ED secondary to persistent chest pain.      Unable to see patient at time of visit, off unit  BG trending within/close to target range while NPO  Received Lantus 15 units last night for NPO status  Recommend to increase Lantus to 18 units SQ qHS for tonight  Continue Admelog 6 units SQ TID before meals (Hold if NPO/not eating meal)  Continue Admelog LOW dose correctional scale before meals, low dose at bedtime  Check BG before meals and bedtime  Consistent carbohydrate diet  See prior endocrine progress notes for complete plan of care     CAPILLARY BLOOD GLUCOSE    POCT Blood Glucose.: 140 mg/dL (08 Dec 2021 11:38)  POCT Blood Glucose.: 202 mg/dL (08 Dec 2021 08:04)  POCT Blood Glucose.: 278 mg/dL (07 Dec 2021 23:22)  POCT Blood Glucose.: 323 mg/dL (07 Dec 2021 17:15)    12-08    138  |  103  |  14  ----------------------------<  206<H>  4.0   |  25  |  0.45<L>    Ca    9.1      08 Dec 2021 07:55  Phos  3.8     12-08  Mg     2.00     12-08    MEDICATIONS  (STANDING):  aspirin enteric coated 81 milliGRAM(s) Oral daily  atorvastatin 80 milliGRAM(s) Oral at bedtime  calcium carbonate 1250 mG  + Vitamin D (OsCal 500 + D) 1 Tablet(s) Oral two times a day  carvedilol 12.5 milliGRAM(s) Oral every 12 hours  dextrose 40% Gel 15 Gram(s) Oral once  dextrose 5%. 1000 milliLiter(s) (50 mL/Hr) IV Continuous <Continuous>  dextrose 5%. 1000 milliLiter(s) (100 mL/Hr) IV Continuous <Continuous>  dextrose 50% Injectable 25 Gram(s) IV Push once  dextrose 50% Injectable 12.5 Gram(s) IV Push once  dextrose 50% Injectable 25 Gram(s) IV Push once  enoxaparin Injectable 40 milliGRAM(s) SubCutaneous at bedtime  glucagon  Injectable 1 milliGRAM(s) IntraMuscular once  insulin glargine Injectable (LANTUS) 15 Unit(s) SubCutaneous at bedtime  insulin lispro (ADMELOG) corrective regimen sliding scale   SubCutaneous three times a day before meals  insulin lispro (ADMELOG) corrective regimen sliding scale   SubCutaneous at bedtime  insulin lispro Injectable (ADMELOG) 6 Unit(s) SubCutaneous three times a day before meals  lactated ringers. 1000 milliLiter(s) (100 mL/Hr) IV Continuous <Continuous>  multivitamin 1 Tablet(s) Oral daily  ticagrelor 90 milliGRAM(s) Oral every 12 hours    A1C with Estimated Average Glucose Result: 10.9 % (12-06-21 @ 06:39)    Diet, Regular:   Consistent Carbohydrate {Evening Snack} (CSTCHOSN)  Gastroesophageal Reflux Disease (GERD)  DASH/TLC {Sodium & Cholesterol Restricted} (DASH)  Halal  No Caffeine  No Carbonated Beverages  No Chocolate (12-06-21 @ 21:03)    Susie Lee  Nurse Practitioner  Division of Endocrinology & Diabetes  In house pager #[phone]/long range pager #[phone]    If before 9AM or after 6PM, or on weekends/holidays, please call endocrine answering service for assistance ([phone]).  For nonurgent matters email [email or web address] for assistance.
Attempted to visit patient 2x to complete education consult, however, patient off unit at cath lab recovery room. RD will be complete consult tomorrow.    Feli Rapp, TENZINN [phone]
[facility] MEDICINE NIGHT COVERAGE    Patient seen at bedside status post cath via R femoral artery. Site clean, dry and intact. No bleeding, underlying hematoma, or erythema. No bruit auscultated. Patient denies chest pain, SOB, numbness/weakness/tingling. Pulses present, capillary refill appropriate. Patient educated and understands if any of the above symptoms were to arise, to notify RN. Will continue to monitor closely.    T(C): 36.7 (12-08-21 @ 17:47), Max: 37.1 (12-08-21 @ 08:29)  HR: 59 (12-08-21 @ 17:47) (59 - 63)  BP: 135/74 (12-08-21 @ 17:47) (121/60 - 135/74)  RR: 18 (12-08-21 @ 17:47) (18 - 18)  SpO2: 100% (12-08-21 @ 17:47) (99% - 100%)
Medicine PA Note    Pt was c/o stomach pain and constipation. Will give her one dose of Miralax and senna to see if that helps. She is s/p angiogram yesterday with a USMAN to the 90% pLAD. Will continue to monitor.    Glenn Paz PA-C  x [phone]

## 2021-12-09 NOTE — DIETITIAN INITIAL EVALUATION ADULT. - CONTINUE CURRENT NUTRITION CARE PLAN
Consistent Carbohydrate {Evening Snack} GERD, DASH/TLC (cholesterol & Na restricted) Halal, No caffeine, no carbonated beverages, no chocolate/yes

## 2021-12-09 NOTE — DISCHARGE NOTE PROVIDER - PROVIDER TOKENS
PROVIDER:[TOKEN:[[id number]]],FREE:[LAST:[Dr. Mauro Mendoza],PHONE:[phone]],FAX:[(   [phone]    -],ADDRESS:[Cardiologist  [address] [place] [address]]],PROVIDER:[TOKEN:[[id number]]]

## 2021-12-09 NOTE — DIETITIAN INITIAL EVALUATION ADULT. - ADD RECOMMEND
2) Please document % PO intake in flowsheets 3) Recommend follow up with outpatient RD for ongoing support with lifestyle changes 4) Consider appetite stimulant/pro-motility agent for gastroparesis as medically appropriate

## 2021-12-09 NOTE — PROGRESS NOTE ADULT - ASSESSMENT
66 year old female, with Hypertension, uncontrolled Type-2 DM (started on insulin 2 weeks ago, A1c 10.9% 12/2021) complicated by neuropathy and gastroparesis, Dyslipidemia and Osteoporosis, presented to the ED secondary to persistent chest pain.      1. Type 2 DM, uncontrolled   A1c 10.9%, a1c goal < 7%   Home Regimen: Lantus 20 units - per daughter it was prescribed as 20 units 2x per day     While inpatient:   BG target 100-180 mg/dl  Increase Lantus to 21 units SQ qHS  Increase Admelog to 7 units SQ TID before meals (Hold if NPO/not eating meal)   Continue Admelog moderate dose scale before meals, low dose scale at bedtime  Check BG before meals and bedtime  Consistent carbohydrate diet, RD consult done  Hypoglycemia protocol     Discharge Plan:  Basal/bolus insulin PENS with dose TBD.   If discharged today, recommend Lantus 21 units SQ qHS and Humalog 7 units TID before meals (Hold if not eating meal)   Will be resuming Lantus, will need new prescription for Humalog Kwikpen 7 units TID   Please prescribe new glucometer and supplies: glucose test strips, lancets, alcohol swabs and insulin PEN needles  Transitions of care pharmacist assisting with supplies - patient has Medicaid plan which requires supplies to be sent to specific pharmacy. Medications can be sent to retail pharmacy, daughter would prefer [facility]   Followup with prior endocrinologist Dr. Amy Washington at [facility].     2. Hypertension  BP goal <130/80, management as per primary team    3. HLD  LDL 71  Continue Atorvastatin 80 mg daily if no contraindications    Susie Lee  Nurse Practitioner  Division of Endocrinology & Diabetes  In house pager #[phone]/long range pager #[phone]    If before 9AM or after 6PM, or on weekends/holidays, please call endocrine answering service for assistance ([phone]).  For nonurgent matters email [email or web address] for assistance.  Greater than 35 minutes spent in assessment, coordination of care and education for uncontrolled diabetes, with greater than 50% in face to face education with patient    66 year old female, with Hypertension, uncontrolled Type-2 DM (started on insulin 2 weeks ago, A1c 10.9% 12/2021) complicated by neuropathy and gastroparesis, Dyslipidemia and Osteoporosis, presented to the ED secondary to persistent chest pain.      1. Type 2 DM, uncontrolled   A1c 10.9%, a1c goal < 7%   Home Regimen: Lantus 20 units - per daughter it was prescribed as 20 units 2x per day     While inpatient:   BG target 100-180 mg/dl  Increase Lantus to 21 units SQ qHS  Increase Admelog to 7 units SQ TID before meals (Hold if NPO/not eating meal)   Continue Admelog moderate dose scale before meals, low dose scale at bedtime  Check BG before meals and bedtime  Consistent carbohydrate diet, RD consult done  Hypoglycemia protocol     Discharge Plan:  Basal/bolus insulin PENS with dose TBD.   If discharged today, recommend Lantus 21 units SQ qHS and Humalog 7 units TID before meals (Hold if not eating meal)   Will be resuming Lantus, will need new prescription for Humalog Kwikpen 7 units TID   No Metformin, No Jardiance. Can consider as outpatient. Basal/bolus insulin plan for now  Please prescribe new glucometer and supplies: glucose test strips, lancets, alcohol swabs and insulin PEN needles  Transitions of care pharmacist assisting with supplies - patient has Medicaid plan which requires supplies to be sent to specific pharmacy. Medications can be sent to retail pharmacy, daughter would prefer [facility]   Followup with prior endocrinologist Dr. Amy Washington at [facility].     2. Hypertension  BP goal <130/80, management as per primary team    3. HLD  LDL 71  Continue Atorvastatin 80 mg daily if no contraindications    Susie Lee  Nurse Practitioner  Division of Endocrinology & Diabetes  In house pager #[phone]/long range pager #[phone]    If before 9AM or after 6PM, or on weekends/holidays, please call endocrine answering service for assistance ([phone]).  For nonurgent matters email [email or web address] for assistance.  Greater than 35 minutes spent in assessment, coordination of care and education for uncontrolled diabetes, with greater than 50% in face to face education with patient

## 2021-12-09 NOTE — DIETITIAN INITIAL EVALUATION ADULT. - REASON INDICATOR FOR ASSESSMENT
Uncontrolled diabetes and gastroparesis  Source: EMR, patient, daughter provided translation over the phone

## 2021-12-09 NOTE — PROGRESS NOTE ADULT - ASSESSMENT
66 year old female, with past history significant for Hypertension, Type-2 DM (started on insulin 2 weeks ago), Dyslipidemia and Osteoporosis, presented to the ED secondary to persistent chest pain.    EKG: NSR TWI v1-v3    1. Chest pain  -trops negative  -CXR clear  -echo with mild-mod AR, normal LV function, no WMA, mild diastolic dysfunction  -s/p LHC with prox LAD 90% s/p USMAN  -c/w asa, brilinta and atorvastatin  -having right groin pain. no hematoma or drainage at sight. s/p LHC obtain ultrsound    2. HTN  -improving  -c/w  coreg 12.5mg BID  -BP elevated during cath, losartan increased to 100mg daily   -continue to monitor BP     3. DM  -c/w insulin    4. DVT prophylaxis  -lovenox subq

## 2021-12-09 NOTE — PHARMACOTHERAPY INTERVENTION NOTE - COMMENTS
Patient's son and patient were educated at bedside (pt preferred her son to translate). Pt and son educated on A1c, goal A1c, basal/bolus insulin pen administration, hypoglycemia and treatment, healthy plate, sources of carbohydrates, goal BG, and when to check BG; pt was able to return demonstration on insulin pen (taught her to prime the pen each time). Counseled pt on where to inject insulin (abdomen, outer thighs), rotating injection site to prevent lipodystrophy, proper insulin storage, and sharps disposal. Patient has Age [facility] long-term care medicaid for diabetes supplies (Fax: [phone]); all other prescriptions can go to pharmacy. Pt encouraged for outpt f/u with medicine and endocrine.

## 2021-12-09 NOTE — DISCHARGE NOTE PROVIDER - HOSPITAL COURSE
65 y/o female, with a PmHx for Hypertension, Type-2 DM (started on insulin 2 weeks ago), Dyslipidemia and Osteoporosis, presented to the the [facility] ED secondary to persistent chest pain.  Seen and evaluated at bedside; appears worried, but in NAD.  Reports uncontrolled DM requiring initiation of insulin (Lantus) approximately 2 weeks ago; initially started at 10 units, increased to 15 and is now at 10 units HS because of uncontrolled values.  Chest pain and associated signs/symptoms began after insulin prescription.  Notes chest pain involving the bilateral chest and ?midsternal area, associated with elevated blood pressure, dizziness, bitemporal headaches (sometimes global headaches) and a "whooshing" sounds at times.  Chest pain reportedly intermittent for the past ~ one week, but was of longer duration today than usual; lasted approximately 4 hours and was at the midsternal area.  Blood pressure has been periodically elevated and PMD increased Losartan from 50 mg daily to 50 mg BID, but remains uncontrolled.  BP was elevated today ('s to 200).  Elevated BP is associated with chest pain, palpitations, shortness of breath, dizziness, headaches (as noted above).  Patient complains of FLORES; daughter states that even with patient talking on the telephone for 10 to 15 minutes causes shortness of breath.  Dyspnea on minimal exertion.  This is noted since insulin therapy was started.  Patient has been to 3 different doctors (including PMD and Cardiologist - Dr Mendoza); told by PMD that ECG was abnormal and subsequently saw cardiologist. Now admitted to telemetry for r/o acs.    On admission:    1. Chest pain  EKG: Sinus Bradycardia @ 56, TWI in V2, V3, V4.  QTc = 445  hsTrop: 7-->7--> <6  12/7 Echo - EF 69%, Mitral annular calcification, otherwise normal mitral  valve. Minimal mitral regurgitation. Calcified trileaflet aortic valve with normal opening. Mild-moderate aortic regurgitation. Normal left ventricular internal dimensions and wall thicknesses. Normal left ventricular systolic function. No segmental wall motion abnormalities.  Mild diastolic dysfunction (Stage I). Normal right ventricular size and function.  12/8 Cardiac Cath - LVEDP 18; LCx 10, mRCA 20-30, resolute cornell stent pLAD 90; RFA access  - ASA/Brilinta    2. HTN  - monitor bp, cont meds  - carvedilol, losartan    3. HLD  - lipid profile  - cont atorvastatin     4. DM2  HgbA1-c: 10.9  - montior fs  - lantus, pre-meals, sliding scale   - House endo c/s following     Pt comfortable at this time and is now medically cleared for discharge home as per Dr. Santos. Outpatient follow up.    Reviewed discharge medications with patient; All new medications requiring new prescription sent to pharmacy of patients choice. Reviewed need for prescription from previous home medications and new prescriptions sent if requested. Patient in agreement and understands. 67 y/o female, with a PmHx for Hypertension, Type-2 DM (started on insulin 2 weeks ago), Dyslipidemia and Osteoporosis, presented to the the [facility] ED secondary to persistent chest pain.  Seen and evaluated at bedside; appears worried, but in NAD.  Reports uncontrolled DM requiring initiation of insulin (Lantus) approximately 2 weeks prior to admission; initially started at 10 units, increased to 15 and presented to [facility] with 10 units HS because of uncontrolled values.  Chest pain and associated signs/symptoms began after insulin prescription.  Notes chest pain involving the bilateral chest and ?midsternal area, associated with elevated blood pressure, dizziness, bitemporal headaches (sometimes global headaches) and a "whooshing" sounds at times.  Chest pain reportedly intermittent for the past ~ one week prior to admission, but was of longer duration today than usual; lasted approximately 4 hours and was at the midsternal area.  Blood pressure has been periodically elevated and PMD increased Losartan from 50 mg daily to 50 mg BID, but remains uncontrolled.  BP was elevated ('s to 200).  Elevated BP is associated with chest pain, palpitations, shortness of breath, dizziness, headaches (as noted above).  Patient complains of FLORES; daughter states that even with patient talking on the telephone for 10 to 15 minutes causes shortness of breath.  Dyspnea on minimal exertion.  This is noted since insulin therapy was started.  Patient has been to 3 different doctors (including PMD and Cardiologist - Dr Mendoza); told by PMD that ECG was abnormal and subsequently saw cardiologist. Now admitted to telemetry for r/o acs.    1. Acute chest pain.   - presented with uncontrolled HTN, dyspnea on minimal exertion, headaches, palpitations, dizziness  - troponin = 7 --> 7  - ECG w/ finding of: sinus bradycardia @ 56 bpm, TWI in V2, V3, V4.  QTc = 445  - reportedly w/ "abnormal" ECG from PMD's office  - TTE with mild-mod AR, normal LV function, no WMA, mild diastolic dysfunction  - cards follow up appreciated   -s/p LHC with prox LAD 90% s/p USMAN.  - Patient c/o right groin pain. Right groin VA duplex with no pseudoaneurysm present. Patient's pain now resolved.    2. Dyspnea on minimal exertion.   - onset since initiation of insulin therapy ~ 2 weeks ago, per daughter  - troponin  and ECG as above  - no suspicion for URI at present  - TSH within acceptable range  - HOB elevation  - ishcemic W.U per card.    3. ECG abnormality.   ·  Plan: - ECG w/ finding of: sinus bradycardia @ 56 bpm, TWI in V2, V3, V4.  QTc = 445  - in the setting of chest pain, uncontrolled Type-2 DM  - report of "abnormal" ECG PTA  - troponin as above    4. Uncontrolled hypertension.   - Previously noted to that  to 200. Has associated, bitemporal, and at times global, headaches, dizziness, chest pain, palpitations  - has been having difficulty w/ uncontrolled BP since initiation of insulin tx ~ 2 weeks prior to admission (per patient/daughter)  - Blood pressure medications adjusted accordingly, now normotensive    5. Uncontrolled type 2 diabetes mellitus with insulin therapy.   ·  Plan: - glucose = 266 on CMP; no recent A1c for comparison  - report that blood glucose uncontrolled - in the 300's range at home, so was started on Lantus 10 units, but since uncontrolled, titrated to 15 units and is now at 20 units HS  - Diabetic supplies sent to [facility] for patient to .       Dispo: Home with home care    Patient seen and evaluated, no acute somatic complaints. Reviewed discharge medications with patient; All new medications requiring new prescriptions were sent to the pharmacy of patient's choice. Reviewed need for prescription for previous home medications and new prescriptions sent if requested. Medically cleared/stable for discharge as per Dr. Santos on 12/10/2021 with close outpatient follow up within 1-2 weeks of discharge. Patient understands and agrees with plan of care 65 y/o female, with a PmHx for Hypertension, Type-2 DM (started on insulin 2 weeks ago), Dyslipidemia and Osteoporosis, presented to the the [facility] ED secondary to persistent chest pain.  Seen and evaluated at bedside; appears worried, but in NAD.  Reports uncontrolled DM requiring initiation of insulin (Lantus) approximately 2 weeks prior to admission; initially started at 10 units, increased to 15 and presented to [facility] with 10 units HS because of uncontrolled values.  Chest pain and associated signs/symptoms began after insulin prescription.  Notes chest pain involving the bilateral chest and ?midsternal area, associated with elevated blood pressure, dizziness, bitemporal headaches (sometimes global headaches) and a "whooshing" sounds at times.  Chest pain reportedly intermittent for the past ~ one week prior to admission, but was of longer duration today than usual; lasted approximately 4 hours and was at the midsternal area.  Blood pressure has been periodically elevated and PMD increased Losartan from 50 mg daily to 50 mg BID, but remains uncontrolled.  BP was elevated ('s to 200).  Elevated BP is associated with chest pain, palpitations, shortness of breath, dizziness, headaches (as noted above).  Patient complains of FLORES; daughter states that even with patient talking on the telephone for 10 to 15 minutes causes shortness of breath.  Dyspnea on minimal exertion.  This is noted since insulin therapy was started.  Patient has been to 3 different doctors (including PMD and Cardiologist - Dr Mendoza); told by PMD that ECG was abnormal and subsequently saw cardiologist. Now admitted to telemetry for r/o acs.    1. Acute chest pain.   - presented with uncontrolled HTN, dyspnea on minimal exertion, headaches, palpitations, dizziness  - troponin = 7 --> 7  - ECG w/ finding of: sinus bradycardia @ 56 bpm, TWI in V2, V3, V4.  QTc = 445  - reportedly w/ "abnormal" ECG from PMD's office  - TTE with mild-mod AR, normal LV function, no WMA, mild diastolic dysfunction  - cards follow up appreciated   -s/p LHC with prox LAD 90% s/p USMAN.  - Patient c/o right groin pain. Right groin VA duplex with no pseudoaneurysm present. Patient's pain now resolved.  - Patient to follow up with PCP in 1-2 weeks    2. Dyspnea on minimal exertion.   - onset since initiation of insulin therapy ~ 2 weeks prior to admission, per daughter  - troponin  and ECG as above  - no suspicion for URI at present  - TSH within acceptable range  - HOB elevation  -  3. ECG abnormality.   ·  Plan: - ECG w/ finding of: sinus bradycardia @ 56 bpm, TWI in V2, V3, V4.  QTc = 445  - in the setting of chest pain, uncontrolled Type-2 DM  - report of "abnormal" ECG PTA  - troponin as above    4. Uncontrolled hypertension.   - Previously noted to that  to 200. Has associated, bitemporal, and at times global, headaches, dizziness, chest pain, palpitations  - has been having difficulty w/ uncontrolled BP since initiation of insulin tx ~ 2 weeks prior to admission (per patient/daughter)  - Blood pressure medications adjusted accordingly, now normotensive    5. Uncontrolled type 2 diabetes mellitus with insulin therapy.   - glucose = 266 on CMP; no recent A1c for comparison  - report that blood glucose uncontrolled - in the 300's range at home, so was started on Lantus 10 units, but since uncontrolled, titrated to 15 units and is now at 20 units HS  - Diabetic supplies sent to [facility] pharmacy for patient to .       Dispo: Home with home care. Prior to discharge, patient received 3rd dose of Moderna COVID-19 vaccination without any complications and tolerated it well.    Patient seen and evaluated, no acute somatic complaints. Reviewed discharge medications with patient; All new medications requiring new prescriptions were sent to the pharmacy of patient's choice. Reviewed need for prescription for previous home medications and new prescriptions sent if requested. Medically cleared/stable for discharge as per Dr. Santos on 12/10/2021 with close outpatient follow up within 1-2 weeks of discharge. Patient understands and agrees with plan of care

## 2021-12-10 ENCOUNTER — TRANSCRIPTION ENCOUNTER (OUTPATIENT)
Age: 66
End: 2021-12-10

## 2021-12-10 VITALS
HEART RATE: 61 BPM | SYSTOLIC BLOOD PRESSURE: 108 MMHG | RESPIRATION RATE: 19 BRPM | OXYGEN SATURATION: 99 % | DIASTOLIC BLOOD PRESSURE: 62 MMHG | TEMPERATURE: 98 F

## 2021-12-10 LAB
ANION GAP SERPL CALC-SCNC: 9 MMOL/L — SIGNIFICANT CHANGE UP (ref 7–14)
BASOPHILS # BLD AUTO: 0.06 K/UL — SIGNIFICANT CHANGE UP (ref 0–0.2)
BASOPHILS NFR BLD AUTO: 0.9 % — SIGNIFICANT CHANGE UP (ref 0–2)
BUN SERPL-MCNC: 15 MG/DL — SIGNIFICANT CHANGE UP (ref 7–23)
CALCIUM SERPL-MCNC: 8.5 MG/DL — SIGNIFICANT CHANGE UP (ref 8.4–10.5)
CHLORIDE SERPL-SCNC: 108 MMOL/L — HIGH (ref 98–107)
CO2 SERPL-SCNC: 22 MMOL/L — SIGNIFICANT CHANGE UP (ref 22–31)
CREAT SERPL-MCNC: 0.48 MG/DL — LOW (ref 0.5–1.3)
EOSINOPHIL # BLD AUTO: 0.23 K/UL — SIGNIFICANT CHANGE UP (ref 0–0.5)
EOSINOPHIL NFR BLD AUTO: 3.3 % — SIGNIFICANT CHANGE UP (ref 0–6)
GLUCOSE SERPL-MCNC: 117 MG/DL — HIGH (ref 70–99)
HCT VFR BLD CALC: 38.9 % — SIGNIFICANT CHANGE UP (ref 34.5–45)
HGB BLD-MCNC: 12.5 G/DL — SIGNIFICANT CHANGE UP (ref 11.5–15.5)
IANC: 3.94 K/UL — SIGNIFICANT CHANGE UP (ref 1.5–8.5)
IMM GRANULOCYTES NFR BLD AUTO: 0.3 % — SIGNIFICANT CHANGE UP (ref 0–1.5)
LYMPHOCYTES # BLD AUTO: 2.02 K/UL — SIGNIFICANT CHANGE UP (ref 1–3.3)
LYMPHOCYTES # BLD AUTO: 29.2 % — SIGNIFICANT CHANGE UP (ref 13–44)
MCHC RBC-ENTMCNC: 29.6 PG — SIGNIFICANT CHANGE UP (ref 27–34)
MCHC RBC-ENTMCNC: 32.1 GM/DL — SIGNIFICANT CHANGE UP (ref 32–36)
MCV RBC AUTO: 92.2 FL — SIGNIFICANT CHANGE UP (ref 80–100)
MONOCYTES # BLD AUTO: 0.65 K/UL — SIGNIFICANT CHANGE UP (ref 0–0.9)
MONOCYTES NFR BLD AUTO: 9.4 % — SIGNIFICANT CHANGE UP (ref 2–14)
NEUTROPHILS # BLD AUTO: 3.94 K/UL — SIGNIFICANT CHANGE UP (ref 1.8–7.4)
NEUTROPHILS NFR BLD AUTO: 56.9 % — SIGNIFICANT CHANGE UP (ref 43–77)
NRBC # BLD: 0 /100 WBCS — SIGNIFICANT CHANGE UP
NRBC # FLD: 0 K/UL — SIGNIFICANT CHANGE UP
PLATELET # BLD AUTO: 235 K/UL — SIGNIFICANT CHANGE UP (ref 150–400)
POTASSIUM SERPL-MCNC: 3.9 MMOL/L — SIGNIFICANT CHANGE UP (ref 3.5–5.3)
POTASSIUM SERPL-SCNC: 3.9 MMOL/L — SIGNIFICANT CHANGE UP (ref 3.5–5.3)
RBC # BLD: 4.22 M/UL — SIGNIFICANT CHANGE UP (ref 3.8–5.2)
RBC # FLD: 13.1 % — SIGNIFICANT CHANGE UP (ref 10.3–14.5)
SODIUM SERPL-SCNC: 139 MMOL/L — SIGNIFICANT CHANGE UP (ref 135–145)
WBC # BLD: 6.92 K/UL — SIGNIFICANT CHANGE UP (ref 3.8–10.5)
WBC # FLD AUTO: 6.92 K/UL — SIGNIFICANT CHANGE UP (ref 3.8–10.5)

## 2021-12-10 PROCEDURE — 76942 ECHO GUIDE FOR BIOPSY: CPT | Mod: 26

## 2021-12-10 RX ORDER — CARVEDILOL PHOSPHATE 80 MG/1
1 CAPSULE, EXTENDED RELEASE ORAL
Qty: 60 | Refills: 0
Start: 2021-12-10 | End: 2022-01-08

## 2021-12-10 RX ORDER — LOSARTAN POTASSIUM 100 MG/1
1 TABLET, FILM COATED ORAL
Qty: 30 | Refills: 0
Start: 2021-12-10 | End: 2022-01-08

## 2021-12-10 RX ORDER — CARVEDILOL PHOSPHATE 80 MG/1
1 CAPSULE, EXTENDED RELEASE ORAL
Qty: 0 | Refills: 0 | DISCHARGE

## 2021-12-10 RX ORDER — ATORVASTATIN CALCIUM 80 MG/1
1 TABLET, FILM COATED ORAL
Qty: 30 | Refills: 0
Start: 2021-12-10 | End: 2022-01-08

## 2021-12-10 RX ORDER — TICAGRELOR 90 MG/1
1 TABLET ORAL
Qty: 60 | Refills: 0
Start: 2021-12-10 | End: 2022-01-08

## 2021-12-10 RX ORDER — CX-024414 0.2 MG/ML
0.25 INJECTION, SUSPENSION INTRAMUSCULAR ONCE
Refills: 0 | Status: DISCONTINUED | OUTPATIENT
Start: 2021-12-10 | End: 2021-12-10

## 2021-12-10 RX ORDER — CX-024414 0.2 MG/ML
0.25 INJECTION, SUSPENSION INTRAMUSCULAR ONCE
Refills: 0 | Status: COMPLETED | OUTPATIENT
Start: 2021-12-10 | End: 2021-12-10

## 2021-12-10 RX ORDER — ENOXAPARIN SODIUM 100 MG/ML
21 INJECTION SUBCUTANEOUS
Qty: 1 | Refills: 0
Start: 2021-12-10 | End: 2022-01-08

## 2021-12-10 RX ORDER — INSULIN LISPRO 100/ML
7 VIAL (ML) SUBCUTANEOUS
Qty: 1 | Refills: 0
Start: 2021-12-10 | End: 2022-01-08

## 2021-12-10 RX ADMIN — CX-024414 0.25 MILLILITER(S): 0.2 INJECTION, SUSPENSION INTRAMUSCULAR at 17:36

## 2021-12-10 RX ADMIN — CARVEDILOL PHOSPHATE 12.5 MILLIGRAM(S): 80 CAPSULE, EXTENDED RELEASE ORAL at 06:22

## 2021-12-10 RX ADMIN — TICAGRELOR 90 MILLIGRAM(S): 90 TABLET ORAL at 17:35

## 2021-12-10 RX ADMIN — Medication 7 UNIT(S): at 12:43

## 2021-12-10 RX ADMIN — Medication 7 UNIT(S): at 17:35

## 2021-12-10 RX ADMIN — Medication 2: at 12:43

## 2021-12-10 RX ADMIN — TICAGRELOR 90 MILLIGRAM(S): 90 TABLET ORAL at 06:23

## 2021-12-10 RX ADMIN — Medication 1 TABLET(S): at 06:22

## 2021-12-10 RX ADMIN — LOSARTAN POTASSIUM 100 MILLIGRAM(S): 100 TABLET, FILM COATED ORAL at 06:23

## 2021-12-10 RX ADMIN — Medication 7 UNIT(S): at 08:43

## 2021-12-10 RX ADMIN — Medication 81 MILLIGRAM(S): at 12:43

## 2021-12-10 RX ADMIN — Medication 1 TABLET(S): at 12:43

## 2021-12-10 RX ADMIN — Medication 1 TABLET(S): at 17:35

## 2021-12-10 NOTE — PROGRESS NOTE ADULT - PROBLEM SELECTOR PROBLEM 2
Dyspnea on minimal exertion
Dyspnea on minimal exertion
Hypertension
Dyspnea on minimal exertion
Dyspnea on minimal exertion

## 2021-12-10 NOTE — PROGRESS NOTE ADULT - PROVIDER SPECIALTY LIST ADULT
Endocrinology
Cardiology
Endocrinology
Internal Medicine

## 2021-12-10 NOTE — DISCHARGE NOTE NURSING/CASE MANAGEMENT/SOCIAL WORK - NSDCPEFALRISK_GEN_ALL_CORE
For information on Fall & Injury Prevention, visit: [email or web address] OR  [email or web address] OR  https://www.cdc.gov/steadi/patient.html

## 2021-12-10 NOTE — PROGRESS NOTE ADULT - PROBLEM SELECTOR PLAN 1
- presented with uncontrolled HTN, dyspnea on minimal exertion, headaches, palpitations, dizziness  - troponin = 7 --> 7  - ECG w/ finding of: sinus bradycardia @ 56 bpm, TWI in V2, V3, V4.  QTc = 445  - reportedly w/ "abnormal" ECG from PMD's office  - ED prescriptions: aspirin 324 mg x one  - currently continued on carvedilol 12 mg Q12H, losartan 50 mg Q12H, atorvastatin 40 mg HS, aspirin 81 mg  - f/u TTE   - card follow up appreciated   - continues on Telemetry monitoring  -s/p LHC with prox LAD 90% s/p USMAN
- presented with uncontrolled HTN, dyspnea on minimal exertion, headaches, palpitations, dizziness  - troponin = 7 --> 7  - ECG w/ finding of: sinus bradycardia @ 56 bpm, TWI in V2, V3, V4.  QTc = 445  - reportedly w/ "abnormal" ECG from PMD's office  - ED prescriptions: aspirin 324 mg x one  - currently continued on carvedilol 12 mg Q12H, losartan 50 mg Q12H, atorvastatin 40 mg HS, aspirin 81 mg  - f/u TTE   - card follow up appreciated   - continues on Telemetry monitoring  -s/p LHC with prox LAD 90% s/p USMAN
- presented with uncontrolled HTN, dyspnea on minimal exertion, headaches, palpitations, dizziness  - troponin = 7 --> 7  - ECG w/ finding of: sinus bradycardia @ 56 bpm, TWI in V2, V3, V4.  QTc = 445  - reportedly w/ "abnormal" ECG from PMD's office  - ED prescriptions: aspirin 324 mg x one  - currently continued on carvedilol 12 mg Q12H, losartan 50 mg Q12H, atorvastatin 40 mg HS, aspirin 81 mg  - f/u TTE (ordered)  - card follow up appreciated   - continues on Telemetry monitoring
- presented with uncontrolled HTN, dyspnea on minimal exertion, headaches, palpitations, dizziness  - troponin = 7 --> 7  - ECG w/ finding of: sinus bradycardia @ 56 bpm, TWI in V2, V3, V4.  QTc = 445  - reportedly w/ "abnormal" ECG from PMD's office  - ED prescriptions: aspirin 324 mg x one  - currently continued on carvedilol 12 mg Q12H, losartan 50 mg Q12H, atorvastatin 40 mg HS, aspirin 81 mg  - f/u TTE   - card follow up appreciated   - continues on Telemetry monitoring  - Cath per card

## 2021-12-10 NOTE — PROGRESS NOTE ADULT - SUBJECTIVE AND OBJECTIVE BOX
Jean-Pierre Santos MD  Interventional Cardiology / Advance Heart Failure and Cardiac Transplant Specialist  [place] Office : [address] [place] [address] [place]. [address]  Tel:   [place] Office : [address] [facility] [place] [address]  Tel: [phone]  Cell : [phone]    Subjective/Overnight events: Pt is lying in bed comfortable not in distress, no chest pains no SOB no palpitations  	  MEDICATIONS:  aspirin enteric coated 81 milliGRAM(s) Oral daily  carvedilol 12.5 milliGRAM(s) Oral every 12 hours  enoxaparin Injectable 40 milliGRAM(s) SubCutaneous at bedtime  ticagrelor 90 milliGRAM(s) Oral every 12 hours        acetaminophen     Tablet .. 650 milliGRAM(s) Oral every 6 hours PRN  melatonin 3 milliGRAM(s) Oral at bedtime PRN    aluminum hydroxide/magnesium hydroxide/simethicone Suspension 30 milliLiter(s) Oral every 4 hours PRN  magnesium hydroxide Suspension 30 milliLiter(s) Oral daily PRN    atorvastatin 80 milliGRAM(s) Oral at bedtime  dextrose 40% Gel 15 Gram(s) Oral once  dextrose 50% Injectable 25 Gram(s) IV Push once  dextrose 50% Injectable 12.5 Gram(s) IV Push once  dextrose 50% Injectable 25 Gram(s) IV Push once  glucagon  Injectable 1 milliGRAM(s) IntraMuscular once  insulin glargine Injectable (LANTUS) 15 Unit(s) SubCutaneous at bedtime  insulin lispro (ADMELOG) corrective regimen sliding scale   SubCutaneous three times a day before meals  insulin lispro (ADMELOG) corrective regimen sliding scale   SubCutaneous at bedtime  insulin lispro Injectable (ADMELOG) 6 Unit(s) SubCutaneous three times a day before meals    calcium carbonate 1250 mG  + Vitamin D (OsCal 500 + D) 1 Tablet(s) Oral two times a day  dextrose 5%. 1000 milliLiter(s) IV Continuous <Continuous>  dextrose 5%. 1000 milliLiter(s) IV Continuous <Continuous>  lactated ringers. 1000 milliLiter(s) IV Continuous <Continuous>  multivitamin 1 Tablet(s) Oral daily      PAST MEDICAL/SURGICAL HISTORY  PAST MEDICAL & SURGICAL HISTORY:  DM (diabetes mellitus)    Hypertension    High cholesterol    Constipation    History of cholecystectomy  ~ 2017        SOCIAL HISTORY: Substance Use (street drugs): ( x ) never used  (  ) other:    FAMILY HISTORY:  No pertinent family history in first degree relatives          PHYSICAL EXAM:  T(C): 37.1 (12-08-21 @ 08:29), Max: 37.1 (12-08-21 @ 08:29)  HR: 63 (12-08-21 @ 08:29) (61 - 68)  BP: 123/75 (12-08-21 @ 08:29) (121/60 - 143/80)  RR: 18 (12-08-21 @ 08:29) (18 - 18)  SpO2: 100% (12-08-21 @ 08:29) (99% - 100%)  Wt(kg): --  I&O's Summary        GENERAL: NAD  EYES: EOMI, PERRLA, conjunctiva and sclera clear  ENMT: No tonsillar erythema, exudates, or enlargement; Moist mucous membranes, Good dentition, No lesions  Cardiovascular: Normal S1 S2, No JVD, No murmurs, No edema  Respiratory: Lungs clear to auscultation	  Gastrointestinal:  Soft, Non-tender, + BS	  Extremities: Normal range of motion, No clubbing, cyanosis or edema  LYMPH: No lymphadenopathy noted  NERVOUS SYSTEM:  Alert & Oriented X3, Good concentration; Motor Strength 5/5 B/L upper and lower extremities; DTRs 2+ intact and symmetric                                    13.3   5.53  )-----------( 246      ( 08 Dec 2021 07:55 )             40.6     12-08    138  |  103  |  14  ----------------------------<  206<H>  4.0   |  25  |  0.45<L>    Ca    9.1      08 Dec 2021 07:55  Phos  3.8     12-08  Mg     2.00     12-08      proBNP:   Lipid Profile:   HgA1c:   TSH:     Consultant(s) Notes Reviewed:  [x ] YES  [ ] NO    Care Discussed with Consultants/Other Providers [ x] YES  [ ] NO    Imaging Personally Reviewed independently:  [x] YES  [ ] NO    All labs, radiologic studies, vitals, orders and medications list reviewed. Patient is seen and examined at bedside. Case discussed with medical team.        
Jean-Pierre Santos MD  Interventional Cardiology / Advance Heart Failure and Cardiac Transplant Specialist  [place] Office : [address] [place] [address] [place] [address]  Tel:   [place] Office : [address] [facility] [place] [address]  Tel: [phone]  Cell : [phone]    Subjective/Overnight events: Pt is lying in bed comfortable not in distress, no chest pains no SOB no palpitations. c/o of right groin pain. no hematoma, drainage noted.   	  MEDICATIONS:  aspirin enteric coated 81 milliGRAM(s) Oral daily  carvedilol 12.5 milliGRAM(s) Oral every 12 hours  enoxaparin Injectable 40 milliGRAM(s) SubCutaneous at bedtime  losartan 100 milliGRAM(s) Oral daily  ticagrelor 90 milliGRAM(s) Oral every 12 hours        acetaminophen     Tablet .. 650 milliGRAM(s) Oral every 6 hours PRN  melatonin 3 milliGRAM(s) Oral at bedtime PRN    aluminum hydroxide/magnesium hydroxide/simethicone Suspension 30 milliLiter(s) Oral every 4 hours PRN  magnesium hydroxide Suspension 30 milliLiter(s) Oral daily PRN  senna 2 Tablet(s) Oral at bedtime    atorvastatin 80 milliGRAM(s) Oral at bedtime  dextrose 40% Gel 15 Gram(s) Oral once  dextrose 50% Injectable 25 Gram(s) IV Push once  dextrose 50% Injectable 12.5 Gram(s) IV Push once  dextrose 50% Injectable 25 Gram(s) IV Push once  glucagon  Injectable 1 milliGRAM(s) IntraMuscular once  insulin glargine Injectable (LANTUS) 21 Unit(s) SubCutaneous at bedtime  insulin lispro (ADMELOG) corrective regimen sliding scale   SubCutaneous three times a day before meals  insulin lispro (ADMELOG) corrective regimen sliding scale   SubCutaneous at bedtime  insulin lispro Injectable (ADMELOG) 7 Unit(s) SubCutaneous three times a day before meals    calcium carbonate 1250 mG  + Vitamin D (OsCal 500 + D) 1 Tablet(s) Oral two times a day  dextrose 5%. 1000 milliLiter(s) IV Continuous <Continuous>  dextrose 5%. 1000 milliLiter(s) IV Continuous <Continuous>  lactated ringers. 1000 milliLiter(s) IV Continuous <Continuous>  multivitamin 1 Tablet(s) Oral daily      PAST MEDICAL/SURGICAL HISTORY  PAST MEDICAL & SURGICAL HISTORY:  DM (diabetes mellitus)    Hypertension    High cholesterol    Constipation    History of cholecystectomy  ~ 2017        SOCIAL HISTORY: Substance Use (street drugs): ( x ) never used  (  ) other:    FAMILY HISTORY:  No pertinent family history in first degree relatives          PHYSICAL EXAM:  T(C): 37.1 (12-09-21 @ 12:00), Max: 37.1 (12-08-21 @ 22:35)  HR: 70 (12-09-21 @ 12:00) (59 - 70)  BP: 120/66 (12-09-21 @ 12:00) (111/63 - 135/74)  RR: 18 (12-09-21 @ 12:00) (17 - 18)  SpO2: 99% (12-09-21 @ 12:00) (98% - 100%)  Wt(kg): --  I&O's Summary        GENERAL: NAD  EYES: EOMI, PERRLA, conjunctiva and sclera clear  ENMT: No tonsillar erythema, exudates, or enlargement; Moist mucous membranes, Good dentition, No lesions  Cardiovascular: Normal S1 S2, No JVD, No murmurs, No edema  Respiratory: Lungs clear to auscultation	  Gastrointestinal:  Soft, Non-tender, + BS	  Extremities: Normal range of motion, No clubbing, cyanosis or edema  LYMPH: No lymphadenopathy noted  NERVOUS SYSTEM:  Alert & Oriented X3, Good concentration; Motor Strength 5/5 B/L upper and lower extremities; DTRs 2+ intact and symmetric                                    12.9   7.84  )-----------( 245      ( 09 Dec 2021 06:27 )             37.2     12-09    139  |  106  |  14  ----------------------------<  160<H>  4.0   |  24  |  0.46<L>    Ca    8.7      09 Dec 2021 06:27  Phos  4.4     12-09  Mg     1.80     12-09      proBNP:   Lipid Profile:   HgA1c:   TSH:     Consultant(s) Notes Reviewed:  [x ] YES  [ ] NO    Care Discussed with Consultants/Other Providers [ x] YES  [ ] NO    Imaging Personally Reviewed independently:  [x] YES  [ ] NO    All labs, radiologic studies, vitals, orders and medications list reviewed. Patient is seen and examined at bedside. Case discussed with medical team.        
Jean-Pierre Santos MD  Interventional Cardiology / Advance Heart Failure and Cardiac Transplant Specialist  [place] Office : [address] [place] [address] [place]. [address]  Tel:   [place] Office : [address] [facility] [place] [address]  Tel: [phone]  Cell : [phone]    Subjective/Overnight events: Pt is lying in bed comfortable not in distress, no chest pains no SOB no palpitations  	  MEDICATIONS:  aspirin enteric coated 81 milliGRAM(s) Oral daily  carvedilol 12.5 milliGRAM(s) Oral every 12 hours  enoxaparin Injectable 40 milliGRAM(s) SubCutaneous at bedtime  losartan 50 milliGRAM(s) Oral daily        acetaminophen     Tablet .. 650 milliGRAM(s) Oral every 6 hours PRN  melatonin 3 milliGRAM(s) Oral at bedtime PRN    aluminum hydroxide/magnesium hydroxide/simethicone Suspension 30 milliLiter(s) Oral every 4 hours PRN  magnesium hydroxide Suspension 30 milliLiter(s) Oral daily PRN    atorvastatin 40 milliGRAM(s) Oral at bedtime  dextrose 40% Gel 15 Gram(s) Oral once  dextrose 50% Injectable 25 Gram(s) IV Push once  dextrose 50% Injectable 12.5 Gram(s) IV Push once  dextrose 50% Injectable 25 Gram(s) IV Push once  glucagon  Injectable 1 milliGRAM(s) IntraMuscular once  insulin glargine Injectable (LANTUS) 15 Unit(s) SubCutaneous at bedtime  insulin lispro (ADMELOG) corrective regimen sliding scale   SubCutaneous three times a day before meals  insulin lispro (ADMELOG) corrective regimen sliding scale   SubCutaneous at bedtime  insulin lispro Injectable (ADMELOG) 6 Unit(s) SubCutaneous three times a day before meals    calcium carbonate 1250 mG  + Vitamin D (OsCal 500 + D) 1 Tablet(s) Oral two times a day  dextrose 5%. 1000 milliLiter(s) IV Continuous <Continuous>  dextrose 5%. 1000 milliLiter(s) IV Continuous <Continuous>  lactated ringers. 1000 milliLiter(s) IV Continuous <Continuous>  multivitamin 1 Tablet(s) Oral daily      PAST MEDICAL/SURGICAL HISTORY  PAST MEDICAL & SURGICAL HISTORY:  DM (diabetes mellitus)    Hypertension    High cholesterol    Constipation    History of cholecystectomy  ~ 2017        SOCIAL HISTORY: Substance Use (street drugs): ( x ) never used  (  ) other:    FAMILY HISTORY:  No pertinent family history in first degree relatives          PHYSICAL EXAM:  T(C): 36.3 (12-07-21 @ 10:24), Max: 36.9 (12-06-21 @ 20:43)  HR: 64 (12-07-21 @ 10:24) (64 - 90)  BP: 113/67 (12-07-21 @ 10:24) (113/67 - 138/78)  RR: 18 (12-07-21 @ 10:24) (16 - 19)  SpO2: 98% (12-07-21 @ 10:24) (97% - 100%)  Wt(kg): --  I&O's Summary        GENERAL: NAD  EYES: EOMI, PERRLA, conjunctiva and sclera clear  ENMT: No tonsillar erythema, exudates, or enlargement; Moist mucous membranes, Good dentition, No lesions  Cardiovascular: Normal S1 S2, No JVD, No murmurs, No edema  Respiratory: Lungs clear to auscultation	  Gastrointestinal:  Soft, Non-tender, + BS	  Extremities: Normal range of motion, No clubbing, cyanosis or edema  LYMPH: No lymphadenopathy noted  NERVOUS SYSTEM:  Alert & Oriented X3, Good concentration; Motor Strength 5/5 B/L upper and lower extremities; DTRs 2+ intact and symmetric                                    13.6   5.92  )-----------( 260      ( 07 Dec 2021 08:23 )             42.3     12-07    138  |  105  |  13  ----------------------------<  211<H>  4.2   |  24  |  0.44<L>    Ca    9.1      07 Dec 2021 08:23  Phos  3.8     12-07  Mg     2.00     12-07    TPro  6.7  /  Alb  4.2  /  TBili  0.5  /  DBili  x   /  AST  21  /  ALT  31  /  AlkPhos  94  12-06    proBNP:   Lipid Profile:   HgA1c:   TSH:     Consultant(s) Notes Reviewed:  [x ] YES  [ ] NO    Care Discussed with Consultants/Other Providers [ x] YES  [ ] NO    Imaging Personally Reviewed independently:  [x] YES  [ ] NO    All labs, radiologic studies, vitals, orders and medications list reviewed. Patient is seen and examined at bedside. Case discussed with medical team.        
Subjective: no hypoglycemia in last 24 hours   tolerating diet  denies nausea/vomiting/diarrhea/ap    MEDICATIONS  (STANDING):  aspirin enteric coated 81 milliGRAM(s) Oral daily  atorvastatin 40 milliGRAM(s) Oral at bedtime  calcium carbonate 1250 mG  + Vitamin D (OsCal 500 + D) 1 Tablet(s) Oral two times a day  carvedilol 12.5 milliGRAM(s) Oral every 12 hours  dextrose 40% Gel 15 Gram(s) Oral once  dextrose 5%. 1000 milliLiter(s) (50 mL/Hr) IV Continuous <Continuous>  dextrose 5%. 1000 milliLiter(s) (100 mL/Hr) IV Continuous <Continuous>  dextrose 50% Injectable 25 Gram(s) IV Push once  dextrose 50% Injectable 12.5 Gram(s) IV Push once  dextrose 50% Injectable 25 Gram(s) IV Push once  enoxaparin Injectable 40 milliGRAM(s) SubCutaneous at bedtime  glucagon  Injectable 1 milliGRAM(s) IntraMuscular once  insulin glargine Injectable (LANTUS) 15 Unit(s) SubCutaneous at bedtime  insulin lispro (ADMELOG) corrective regimen sliding scale   SubCutaneous three times a day before meals  insulin lispro (ADMELOG) corrective regimen sliding scale   SubCutaneous at bedtime  insulin lispro Injectable (ADMELOG) 3 Unit(s) SubCutaneous three times a day before meals  lactated ringers. 1000 milliLiter(s) (100 mL/Hr) IV Continuous <Continuous>  losartan 50 milliGRAM(s) Oral daily  multivitamin 1 Tablet(s) Oral daily    MEDICATIONS  (PRN):  acetaminophen     Tablet .. 650 milliGRAM(s) Oral every 6 hours PRN Mild Pain (1 - 3)  aluminum hydroxide/magnesium hydroxide/simethicone Suspension 30 milliLiter(s) Oral every 4 hours PRN Dyspepsia  magnesium hydroxide Suspension 30 milliLiter(s) Oral daily PRN Constipation  melatonin 3 milliGRAM(s) Oral at bedtime PRN Insomnia    PHYSICAL EXAM:  VITALS: T(C): 36.3 (12-07-21 @ 10:24)  T(F): 97.3 (12-07-21 @ 10:24), Max: 98.5 (12-07-21 @ 05:51)  HR: 64 (12-07-21 @ 10:24) (62 - 90)  BP: 113/67 (12-07-21 @ 10:24) (113/67 - 138/78)  RR:  (16 - 19)  SpO2:  (97% - 100%)  Wt(kg): --  GENERAL: NAD, well-groomed, well-developed  EYES: No proptosis, no injection  HEENT:  Atraumatic, Normocephalic, moist mucous membranes  THYROID: Normal size, no palpable nodules  RESPIRATORY: Clear to auscultation bilaterally; No rales, rhonchi, wheezing, or rubs  CARDIOVASCULAR: Regular rate and rhythm; No murmurs; no peripheral edema  GI: Soft, nontender, non distended, normal bowel sounds  CUSHING'S SIGNS: no striae    POCT Blood Glucose.: 288 mg/dL (12-07-21 @ 12:06)  POCT Blood Glucose.: 190 mg/dL (12-07-21 @ 08:02)  POCT Blood Glucose.: 219 mg/dL (12-06-21 @ 22:00)  POCT Blood Glucose.: 277 mg/dL (12-06-21 @ 17:19)  POCT Blood Glucose.: 249 mg/dL (12-06-21 @ 11:34)  POCT Blood Glucose.: 184 mg/dL (12-06-21 @ 06:12)  POCT Blood Glucose.: 265 mg/dL (12-06-21 @ 00:52)    12-07    138  |  105  |  13  ----------------------------<  211<H>  4.2   |  24  |  0.44<L>    EGFR if : 124  EGFR if non : 107    Ca    9.1      12-07  Mg     2.00     12-07  Phos  3.8     12-07    TPro  6.7  /  Alb  4.2  /  TBili  0.5  /  DBili  x   /  AST  21  /  ALT  31  /  AlkPhos  94  12-06    Thyroid Function Tests:  12-06 @ 06:39 TSH 3.43 FreeT4 -- T3 -- Anti TPO -- Anti Thyroglobulin Ab -- TSI --
Name of Patient : JOHNNY JIMÉNEZ  MRN: [id number]  Date of visit: 12-09-21       Subjective: Patient seen and examined. No new events except as noted.   Doing okay     REVIEW OF SYSTEMS:    CONSTITUTIONAL: No weakness, fevers or chills  EYES/ENT: No visual changes;  No vertigo or throat pain   NECK: No pain or stiffness  RESPIRATORY: No cough, wheezing, hemoptysis; No shortness of breath  CARDIOVASCULAR: No chest pain or palpitations  GASTROINTESTINAL: No abdominal or epigastric pain. No nausea, vomiting, or hematemesis; No diarrhea or constipation. No melena or hematochezia.  GENITOURINARY: No dysuria, frequency or hematuria  NEUROLOGICAL: No numbness or weakness  SKIN: No itching, burning, rashes, or lesions   All other review of systems is negative unless indicated above.    MEDICATIONS:  MEDICATIONS  (STANDING):  aspirin enteric coated 81 milliGRAM(s) Oral daily  atorvastatin 80 milliGRAM(s) Oral at bedtime  calcium carbonate 1250 mG  + Vitamin D (OsCal 500 + D) 1 Tablet(s) Oral two times a day  carvedilol 12.5 milliGRAM(s) Oral every 12 hours  dextrose 40% Gel 15 Gram(s) Oral once  dextrose 5%. 1000 milliLiter(s) (50 mL/Hr) IV Continuous <Continuous>  dextrose 5%. 1000 milliLiter(s) (100 mL/Hr) IV Continuous <Continuous>  dextrose 50% Injectable 25 Gram(s) IV Push once  dextrose 50% Injectable 12.5 Gram(s) IV Push once  dextrose 50% Injectable 25 Gram(s) IV Push once  enoxaparin Injectable 40 milliGRAM(s) SubCutaneous at bedtime  glucagon  Injectable 1 milliGRAM(s) IntraMuscular once  insulin glargine Injectable (LANTUS) 21 Unit(s) SubCutaneous at bedtime  insulin lispro (ADMELOG) corrective regimen sliding scale   SubCutaneous three times a day before meals  insulin lispro (ADMELOG) corrective regimen sliding scale   SubCutaneous at bedtime  insulin lispro Injectable (ADMELOG) 7 Unit(s) SubCutaneous three times a day before meals  lactated ringers. 1000 milliLiter(s) (100 mL/Hr) IV Continuous <Continuous>  losartan 100 milliGRAM(s) Oral daily  multivitamin 1 Tablet(s) Oral daily  senna 2 Tablet(s) Oral at bedtime  ticagrelor 90 milliGRAM(s) Oral every 12 hours      PHYSICAL EXAM:  T(C): 36.7 (12-09-21 @ 22:06), Max: 37.1 (12-09-21 @ 12:00)  HR: 62 (12-09-21 @ 22:06) (62 - 72)  BP: 138/77 (12-09-21 @ 22:06) (111/63 - 138/77)  RR: 18 (12-09-21 @ 22:06) (16 - 18)  SpO2: 100% (12-09-21 @ 22:06) (97% - 100%)  Wt(kg): --  I&O's Summary        Appearance: Normal	  HEENT:  PERRLA   Lymphatic: No lymphadenopathy   Cardiovascular: Normal S1 S2, no JVD  Respiratory: normal effort , clear  Gastrointestinal:  Soft, Non-tender  Skin: No rashes,  warm to touch  Psychiatry:  Mood & affect appropriate  Musculuskeletal: No edema      All labs, Imaging and EKGs personally reviewed                             12.9   7.84  )-----------( 245      ( 09 Dec 2021 06:27 )             37.2               12-09    139  |  106  |  14  ----------------------------<  160<H>  4.0   |  24  |  0.46<L>    Ca    8.7      09 Dec 2021 06:27  Phos  4.4     12-09  Mg     1.80     12-09                                           
Chief Complaint: DM 2 with hyperglycemia    History: Patient seen at bedside. Offered  phone, patient requests that NP speak with her daughter Kristian on the phone [phone]  Per daughter, patient tolerating meals. Patient and family both concerned about her glucose levels here and at home  A1c 10.9%, daughter reports FSBG levels at home were 200s-300s, however glucometer is now broken  Patient sees endocrinologist Dr. Amy Washington at [facility].   Was recently started on Lantus Solostar PEN 20 units 2x PER DAY (?). Was originally ordered as 10 units but increased due to hyperglycemia  No pre-meal insulin or PO medications prescribed, patient was previously taking Metformin and Jardiance and tolerating  Discussed option of adding pre-meal insulin (3x daily) VS resuming pills, patient and daughter would prefer insulin injections for better control    MEDICATIONS  (STANDING):  aspirin enteric coated 81 milliGRAM(s) Oral daily  atorvastatin 80 milliGRAM(s) Oral at bedtime  calcium carbonate 1250 mG  + Vitamin D (OsCal 500 + D) 1 Tablet(s) Oral two times a day  carvedilol 12.5 milliGRAM(s) Oral every 12 hours  dextrose 40% Gel 15 Gram(s) Oral once  dextrose 5%. 1000 milliLiter(s) (50 mL/Hr) IV Continuous <Continuous>  dextrose 5%. 1000 milliLiter(s) (100 mL/Hr) IV Continuous <Continuous>  dextrose 50% Injectable 25 Gram(s) IV Push once  dextrose 50% Injectable 12.5 Gram(s) IV Push once  dextrose 50% Injectable 25 Gram(s) IV Push once  enoxaparin Injectable 40 milliGRAM(s) SubCutaneous at bedtime  glucagon  Injectable 1 milliGRAM(s) IntraMuscular once  insulin glargine Injectable (LANTUS) 18 Unit(s) SubCutaneous at bedtime  insulin lispro (ADMELOG) corrective regimen sliding scale   SubCutaneous three times a day before meals  insulin lispro (ADMELOG) corrective regimen sliding scale   SubCutaneous at bedtime  insulin lispro Injectable (ADMELOG) 6 Unit(s) SubCutaneous three times a day before meals  lactated ringers. 1000 milliLiter(s) (100 mL/Hr) IV Continuous <Continuous>  losartan 100 milliGRAM(s) Oral daily  multivitamin 1 Tablet(s) Oral daily  senna 2 Tablet(s) Oral at bedtime  ticagrelor 90 milliGRAM(s) Oral every 12 hours    MEDICATIONS  (PRN):  acetaminophen     Tablet .. 650 milliGRAM(s) Oral every 6 hours PRN Mild Pain (1 - 3)  aluminum hydroxide/magnesium hydroxide/simethicone Suspension 30 milliLiter(s) Oral every 4 hours PRN Dyspepsia  magnesium hydroxide Suspension 30 milliLiter(s) Oral daily PRN Constipation  melatonin 3 milliGRAM(s) Oral at bedtime PRN Insomnia    Allergies  Cipro (Unknown)  pcn cipro sulfa (Unknown)  penicillin (Hives)  penicillin (Unknown)  sulfa drugs (Unknown)    Review of Systems:  Cardiovascular: No chest pain  Respiratory: No SOB  GI: No nausea, vomiting  Endocrine: no hypoglycemia     PHYSICAL EXAM:  VITALS: T(C): 37.1 (12-09-21 @ 12:00)  T(F): 98.7 (12-09-21 @ 12:00), Max: 98.8 (12-08-21 @ 22:35)  HR: 70 (12-09-21 @ 12:00) (59 - 70)  BP: 120/66 (12-09-21 @ 12:00) (111/63 - 135/74)  RR:  (17 - 18)  SpO2:  (98% - 100%)  Wt(kg): --  GENERAL: NAD  EYES: No proptosis, no lid lag, anicteric  HEENT:  Atraumatic, Normocephalic, moist mucous membranes  RESPIRATORY: unlabored respirations   PSYCH: Alert and oriented x 3, normal affect, normal mood    CAPILLARY BLOOD GLUCOSE    POCT Blood Glucose.: 173 mg/dL (09 Dec 2021 12:36)  POCT Blood Glucose.: 216 mg/dL (09 Dec 2021 08:36)  POCT Blood Glucose.: 270 mg/dL (08 Dec 2021 20:53)  POCT Blood Glucose.: 192 mg/dL (08 Dec 2021 17:57)      12-09    139  |  106  |  14  ----------------------------<  160<H>  4.0   |  24  |  0.46<L>    EGFR if : 122  EGFR if non : 105    Ca    8.7      12-09  Mg     1.80     12-09  Phos  4.4     12-09      Thyroid Function Tests:  12-06 @ 06:39 TSH 3.43 FreeT4 -- T3 -- Anti TPO -- Anti Thyroglobulin Ab -- TSI --      A1C with Estimated Average Glucose Result: 10.9 % (12-06-21 @ 06:39)    Diet, Regular:   Consistent Carbohydrate Evening Snack (CSTCHOSN)  Gastroesophageal Reflux Disease (GERD)  DASH/TLC Sodium & Cholesterol Restricted (DASH)  Halal  No Caffeine  No Carbonated Beverages  No Chocolate (12-06-21 @ 21:03)    
Name of Patient : JOHNNY JIMÉNEZ  MRN: [id number]  Date of visit: 12-07-21       Subjective: Patient seen and examined. No new events except as noted.   Doing okay     REVIEW OF SYSTEMS:    CONSTITUTIONAL: No weakness, fevers or chills  EYES/ENT: No visual changes;  No vertigo or throat pain   NECK: No pain or stiffness  RESPIRATORY: No cough, wheezing, hemoptysis; No shortness of breath  CARDIOVASCULAR: No chest pain or palpitations  GASTROINTESTINAL: No abdominal or epigastric pain.   GENITOURINARY: No dysuria, frequency or hematuria  NEUROLOGICAL: No numbness or weakness  SKIN: No itching, burning, rashes, or lesions   All other review of systems is negative unless indicated above.    MEDICATIONS:  MEDICATIONS  (STANDING):  aspirin enteric coated 81 milliGRAM(s) Oral daily  atorvastatin 40 milliGRAM(s) Oral at bedtime  calcium carbonate 1250 mG  + Vitamin D (OsCal 500 + D) 1 Tablet(s) Oral two times a day  carvedilol 12.5 milliGRAM(s) Oral every 12 hours  dextrose 40% Gel 15 Gram(s) Oral once  dextrose 5%. 1000 milliLiter(s) (50 mL/Hr) IV Continuous <Continuous>  dextrose 5%. 1000 milliLiter(s) (100 mL/Hr) IV Continuous <Continuous>  dextrose 50% Injectable 25 Gram(s) IV Push once  dextrose 50% Injectable 12.5 Gram(s) IV Push once  dextrose 50% Injectable 25 Gram(s) IV Push once  enoxaparin Injectable 40 milliGRAM(s) SubCutaneous at bedtime  glucagon  Injectable 1 milliGRAM(s) IntraMuscular once  insulin glargine Injectable (LANTUS) 15 Unit(s) SubCutaneous at bedtime  insulin lispro (ADMELOG) corrective regimen sliding scale   SubCutaneous three times a day before meals  insulin lispro (ADMELOG) corrective regimen sliding scale   SubCutaneous at bedtime  insulin lispro Injectable (ADMELOG) 6 Unit(s) SubCutaneous three times a day before meals  lactated ringers. 1000 milliLiter(s) (100 mL/Hr) IV Continuous <Continuous>  losartan 50 milliGRAM(s) Oral daily  multivitamin 1 Tablet(s) Oral daily      PHYSICAL EXAM:  T(C): 36.4 (12-07-21 @ 17:30), Max: 36.9 (12-07-21 @ 05:51)  HR: 68 (12-07-21 @ 17:30) (64 - 87)  BP: 143/80 (12-07-21 @ 17:30) (113/67 - 143/80)  RR: 18 (12-07-21 @ 17:30) (18 - 19)  SpO2: 100% (12-07-21 @ 17:30) (98% - 100%)  Wt(kg): --  I&O's Summary        Appearance: Normal	  HEENT:  PERRLA   Lymphatic: No lymphadenopathy   Cardiovascular: Normal S1 S2, no JVD  Respiratory: normal effort , clear  Gastrointestinal:  Soft, Non-tender  Skin: No rashes,  warm to touch  Psychiatry:  Mood & affect appropriate  Musculuskeletal: No edema      All labs, Imaging and EKGs personally reviewed                           13.6   5.92  )-----------( 260      ( 07 Dec 2021 08:23 )             42.3               12-07    138  |  105  |  13  ----------------------------<  211<H>  4.2   |  24  |  0.44<L>    Ca    9.1      07 Dec 2021 08:23  Phos  3.8     12-07  Mg     2.00     12-07    TPro  6.7  /  Alb  4.2  /  TBili  0.5  /  DBili  x   /  AST  21  /  ALT  31  /  AlkPhos  94  12-06    PT/INR - ( 06 Dec 2021 01:43 )   PT: 11.8 sec;   INR: 1.03 ratio         PTT - ( 06 Dec 2021 01:43 )  PTT:33.1 sec                                       
Name of Patient : JOHNNY JIMÉNEZ  MRN: [id number]  Date of visit: 12-08-21       Subjective: Patient seen and examined. No new events except as noted.   Doing okay     REVIEW OF SYSTEMS:    CONSTITUTIONAL: No weakness, fevers or chills  EYES/ENT: No visual changes;  No vertigo or throat pain   NECK: No pain or stiffness  RESPIRATORY: No cough, wheezing, hemoptysis; No shortness of breath  CARDIOVASCULAR: No chest pain or palpitations  GASTROINTESTINAL: No abdominal or epigastric pain.   GENITOURINARY: No dysuria, frequency or hematuria  NEUROLOGICAL: No numbness or weakness  SKIN: No itching, burning, rashes, or lesions   All other review of systems is negative unless indicated above.    MEDICATIONS:  MEDICATIONS  (STANDING):  aspirin enteric coated 81 milliGRAM(s) Oral daily  atorvastatin 80 milliGRAM(s) Oral at bedtime  calcium carbonate 1250 mG  + Vitamin D (OsCal 500 + D) 1 Tablet(s) Oral two times a day  carvedilol 12.5 milliGRAM(s) Oral every 12 hours  dextrose 40% Gel 15 Gram(s) Oral once  dextrose 5%. 1000 milliLiter(s) (50 mL/Hr) IV Continuous <Continuous>  dextrose 5%. 1000 milliLiter(s) (100 mL/Hr) IV Continuous <Continuous>  dextrose 50% Injectable 25 Gram(s) IV Push once  dextrose 50% Injectable 12.5 Gram(s) IV Push once  dextrose 50% Injectable 25 Gram(s) IV Push once  enoxaparin Injectable 40 milliGRAM(s) SubCutaneous at bedtime  glucagon  Injectable 1 milliGRAM(s) IntraMuscular once  insulin glargine Injectable (LANTUS) 18 Unit(s) SubCutaneous at bedtime  insulin lispro (ADMELOG) corrective regimen sliding scale   SubCutaneous three times a day before meals  insulin lispro (ADMELOG) corrective regimen sliding scale   SubCutaneous at bedtime  insulin lispro Injectable (ADMELOG) 6 Unit(s) SubCutaneous three times a day before meals  lactated ringers. 1000 milliLiter(s) (100 mL/Hr) IV Continuous <Continuous>  multivitamin 1 Tablet(s) Oral daily  ticagrelor 90 milliGRAM(s) Oral every 12 hours      PHYSICAL EXAM:  T(C): 36.7 (12-08-21 @ 17:47), Max: 37.1 (12-08-21 @ 08:29)  HR: 59 (12-08-21 @ 17:47) (59 - 63)  BP: 135/74 (12-08-21 @ 17:47) (121/60 - 135/74)  RR: 18 (12-08-21 @ 17:47) (18 - 18)  SpO2: 100% (12-08-21 @ 17:47) (99% - 100%)  Wt(kg): --  I&O's Summary        Appearance: Normal	  HEENT:  PERRLA   Lymphatic: No lymphadenopathy   Cardiovascular: Normal S1 S2, no JVD  Respiratory: normal effort , clear  Gastrointestinal:  Soft, Non-tender  Skin: No rashes,  warm to touch  Psychiatry:  Mood & affect appropriate  Musculuskeletal: No edema      All labs, Imaging and EKGs personally reviewed                           13.3   5.53  )-----------( 246      ( 08 Dec 2021 07:55 )             40.6               12-08    138  |  103  |  14  ----------------------------<  206<H>  4.0   |  25  |  0.45<L>    Ca    9.1      08 Dec 2021 07:55  Phos  3.8     12-08  Mg     2.00     12-08                                             
Name of Patient : JOHNNY JIMÉNEZ  MRN: [id number]  Date of visit: 12-10-21     Subjective: Patient seen and examined. No new events except as noted.     REVIEW OF SYSTEMS:    CONSTITUTIONAL: No weakness, fevers or chills  EYES/ENT: No visual changes;  No vertigo or throat pain   NECK: No pain or stiffness  RESPIRATORY: No cough, wheezing, hemoptysis; No shortness of breath  CARDIOVASCULAR: No chest pain or palpitations  GASTROINTESTINAL: No abdominal or epigastric pain. No nausea, vomiting, or hematemesis; No diarrhea or constipation. No melena or hematochezia.  GENITOURINARY: No dysuria, frequency or hematuria  NEUROLOGICAL: No numbness or weakness  SKIN: No itching, burning, rashes, or lesions   All other review of systems is negative unless indicated above.    MEDICATIONS:  MEDICATIONS  (STANDING):  aspirin enteric coated 81 milliGRAM(s) Oral daily  atorvastatin 80 milliGRAM(s) Oral at bedtime  calcium carbonate 1250 mG  + Vitamin D (OsCal 500 + D) 1 Tablet(s) Oral two times a day  carvedilol 12.5 milliGRAM(s) Oral every 12 hours  coronavirus (EUA) Booster Vaccine (MODERNA) 0.25 milliLiter(s) IntraMuscular once  dextrose 40% Gel 15 Gram(s) Oral once  dextrose 5%. 1000 milliLiter(s) (50 mL/Hr) IV Continuous <Continuous>  dextrose 5%. 1000 milliLiter(s) (100 mL/Hr) IV Continuous <Continuous>  dextrose 50% Injectable 25 Gram(s) IV Push once  dextrose 50% Injectable 12.5 Gram(s) IV Push once  dextrose 50% Injectable 25 Gram(s) IV Push once  enoxaparin Injectable 40 milliGRAM(s) SubCutaneous at bedtime  glucagon  Injectable 1 milliGRAM(s) IntraMuscular once  insulin glargine Injectable (LANTUS) 21 Unit(s) SubCutaneous at bedtime  insulin lispro (ADMELOG) corrective regimen sliding scale   SubCutaneous three times a day before meals  insulin lispro (ADMELOG) corrective regimen sliding scale   SubCutaneous at bedtime  insulin lispro Injectable (ADMELOG) 7 Unit(s) SubCutaneous three times a day before meals  lactated ringers. 1000 milliLiter(s) (100 mL/Hr) IV Continuous <Continuous>  losartan 100 milliGRAM(s) Oral daily  multivitamin 1 Tablet(s) Oral daily  senna 2 Tablet(s) Oral at bedtime  ticagrelor 90 milliGRAM(s) Oral every 12 hours      PHYSICAL EXAM:  T(C): 36.7 (12-10-21 @ 10:51), Max: 36.7 (12-09-21 @ 22:06)  HR: 72 (12-10-21 @ 10:51) (62 - 72)  BP: 125/62 (12-10-21 @ 10:51) (118/59 - 138/77)  RR: 18 (12-10-21 @ 10:51) (18 - 18)  SpO2: 99% (12-10-21 @ 10:51) (94% - 100%)  Wt(kg): --  I&O's Summary    09 Dec 2021 07:01  -  10 Dec 2021 07:00  --------------------------------------------------------  IN: 240 mL / OUT: 0 mL / NET: 240 mL          Appearance: Normal	  HEENT:  PERRLA   Lymphatic: No lymphadenopathy   Cardiovascular: Normal S1 S2, no JVD  Respiratory: normal effort , clear  Gastrointestinal:  Soft, Non-tender  Skin: No rashes,  warm to touch  Psychiatry:  Mood & affect appropriate  Musculuskeletal: No edema      All labs, Imaging and EKGs personally reviewed     12-09-21 @ 07:01  -  12-10-21 @ 07:00  --------------------------------------------------------  IN: 240 mL / OUT: 0 mL / NET: 240 mL                          12.5   6.92  )-----------( 235      ( 10 Dec 2021 06:51 )             38.9               12-10    139  |  108<H>  |  15  ----------------------------<  117<H>  3.9   |  22  |  0.48<L>    Ca    8.5      10 Dec 2021 06:51  Phos  4.4     12-09  Mg     1.80     12-09

## 2021-12-10 NOTE — DISCHARGE NOTE NURSING/CASE MANAGEMENT/SOCIAL WORK - NSSCTYPOFSERV_GEN_ALL_CORE
Visiting nurse will visit you the day after discharge. The nurse will call you in the morning to set up a visit time. If you do not hear from the nurse, please call the agency.

## 2021-12-10 NOTE — PROGRESS NOTE ADULT - PROBLEM SELECTOR PLAN 5
- glucose = 266 on CMP; no recent A1c for comparison  - report that blood glucose uncontrolled - in the 300's range at home, so was started on Lantus 10 units, but since uncontrolled, titrated to 15 units and is now at 20 units HS  - notes signs/symptoms, as above, since starting Lantus  - currently prescribed Lantus 15 units HS, as well as m-ISS per FS  - f/u A1c level  - consistent carb diet  - Endocrinology consult

## 2021-12-10 NOTE — PROGRESS NOTE ADULT - PROBLEM SELECTOR PROBLEM 1
Uncontrolled type 2 diabetes mellitus with insulin therapy
Acute chest pain

## 2021-12-10 NOTE — PROGRESS NOTE ADULT - PROBLEM SELECTOR PLAN 3
- ECG w/ finding of: sinus bradycardia @ 56 bpm, TWI in V2, V3, V4.  QTc = 445  - in the setting of chest pain, uncontrolled Type-2 DM  - report of "abnormal" ECG PTA  - troponin as above  - f/u TTE

## 2021-12-10 NOTE — PROGRESS NOTE ADULT - PROBLEM SELECTOR PLAN 2
- onset since initiation of insulin therapy ~ 2 weeks ago, per daughter  - troponin  and ECG as above  - f/u pro-BNP level (does not appear fluid overloaded at this time)  - no suspicion for URI at present  - TSH within acceptable range  - f/u TTE   - HOB elevation  - ishcemic W.U per card

## 2021-12-10 NOTE — PROGRESS NOTE ADULT - PROBLEM SELECTOR PROBLEM 4
Uncontrolled hypertension

## 2021-12-16 NOTE — PATIENT PROFILE ADULT. - PATIENT/CAREGIVER ACCEPTED INTERPRETER SERVICES
Subjective:       Patient ID: Brad Kearns is a 77 y.o. male.    Chief Complaint: Follow-up    HPIPt is feeling well. Completed lamisil tablets.  No angina.  No SOB.  No GI symptoms - he is very active. Has some chronic allergy symptoms.   Review of Systems   Respiratory: Negative for shortness of breath.    Cardiovascular: Negative for chest pain.   Gastrointestinal: Negative for abdominal pain, diarrhea, nausea and vomiting.   Genitourinary: Negative for dysuria.   Neurological: Negative for seizures, syncope and headaches.       Objective:      Physical Exam  Constitutional:       General: He is not in acute distress.     Appearance: He is well-developed and well-nourished.   HENT:      Mouth/Throat:      Mouth: Oropharynx is clear and moist.   Eyes:      Extraocular Movements: EOM normal.      Pupils: Pupils are equal, round, and reactive to light.   Neck:      Thyroid: No thyromegaly.      Vascular: No JVD.   Cardiovascular:      Rate and Rhythm: Normal rate and regular rhythm.      Pulses: Intact distal pulses.      Heart sounds: Normal heart sounds. No murmur heard.  No friction rub. No gallop.    Pulmonary:      Effort: Pulmonary effort is normal.      Breath sounds: Normal breath sounds. No wheezing or rales.   Abdominal:      General: Bowel sounds are normal. There is no distension.      Palpations: Abdomen is soft. There is no mass.      Tenderness: There is no abdominal tenderness. There is no guarding or rebound.   Genitourinary:     Comments: Pt deferred to his urologist.  Musculoskeletal:         General: No edema.      Cervical back: Neck supple.   Lymphadenopathy:      Cervical: No cervical adenopathy.   Skin:     General: Skin is warm and dry.   Neurological:      Mental Status: He is alert and oriented to person, place, and time.   Psychiatric:         Mood and Affect: Mood and affect normal.         Behavior: Behavior normal.         Thought Content: Thought content normal.         Judgment:  Judgment normal.         Assessment:       1. Essential hypertension    2. Hyperlipidemia, unspecified hyperlipidemia type    3. Coronary artery disease involving native coronary artery of native heart without angina pectoris        Plan:   Essential hypertension  -     COVID-19 (SARS CoV-2) IgG Antibody Quant; Future; Expected date: 12/16/2021  -     CBC Auto Differential; Future; Expected date: 12/16/2021  -     Comprehensive Metabolic Panel; Future; Expected date: 12/16/2021  -     Hepatitis C Antibody; Future; Expected date: 12/16/2021  Double valsartan    Hyperlipidemia, unspecified hyperlipidemia type  -     Lipid Panel; Future; Expected date: 12/16/2021    Coronary artery disease involving native coronary artery of native heart without angina pectoris  -     Ambulatory referral/consult to Cardiology; Future; Expected date: 12/23/2021    Sinusitis  -     amoxicillin (AMOXIL) 875 MG tablet; Take 1 tablet (875 mg total) by mouth every 12 (twelve) hours.  Dispense: 20 tablet; Refill: 0  -     valsartan (DIOVAN) 320 MG tablet; Take 1 tablet (320 mg total) by mouth once daily.  Dispense: 90 tablet; Refill: 3         son at bedside/no

## 2023-02-16 NOTE — PROVIDER CONTACT NOTE (MEDICATION) - SITUATION
BALTAZAR staffed with the hospitalist. The patient may be able to discharge in the
next 2-3 days. They are awaiting culture results. BALTAZAR contacted the patient's
daughter, Aubree, to follow up and review discharge plan. Aubree confirms
that the patient is not ready for hospice and they want to support that. She
states that plan is for the patient to return home with her and family and
resume home health services. She verifies that the patient has services from
[facility] home health.
 
BALTAZAR contacted and confirmed services from [facility] at [facility]. BALTAZAR faxed them
updates.
 
*Discharge plan: home with family support and home health* s/p cardiac cath with resolute cornell stent pLAD initiated on ASA and Brilinta therapy for minimum 1 year

## 2023-06-10 ENCOUNTER — EMERGENCY (EMERGENCY)
Facility: HOSPITAL | Age: 68
LOS: 1 days | Discharge: HOME | End: 2023-06-10
Attending: EMERGENCY MEDICINE | Admitting: EMERGENCY MEDICINE
Payer: MEDICAID

## 2023-06-10 VITALS
SYSTOLIC BLOOD PRESSURE: 124 MMHG | HEART RATE: 58 BPM | RESPIRATION RATE: 16 BRPM | TEMPERATURE: 98 F | OXYGEN SATURATION: 100 % | DIASTOLIC BLOOD PRESSURE: 81 MMHG

## 2023-06-10 VITALS
DIASTOLIC BLOOD PRESSURE: 64 MMHG | HEART RATE: 62 BPM | RESPIRATION RATE: 18 BRPM | SYSTOLIC BLOOD PRESSURE: 149 MMHG | OXYGEN SATURATION: 98 % | TEMPERATURE: 98 F

## 2023-06-10 DIAGNOSIS — Z90.49 ACQUIRED ABSENCE OF OTHER SPECIFIED PARTS OF DIGESTIVE TRACT: Chronic | ICD-10-CM

## 2023-06-10 PROBLEM — K59.00 CONSTIPATION, UNSPECIFIED: Chronic | Status: ACTIVE | Noted: 2021-12-06

## 2023-06-10 LAB
ALBUMIN SERPL ELPH-MCNC: 4.2 G/DL — SIGNIFICANT CHANGE UP (ref 3.3–5)
ALP SERPL-CCNC: 75 U/L — SIGNIFICANT CHANGE UP (ref 40–120)
ALT FLD-CCNC: 21 U/L — SIGNIFICANT CHANGE UP (ref 4–33)
ANION GAP SERPL CALC-SCNC: 10 MMOL/L — SIGNIFICANT CHANGE UP (ref 7–14)
APPEARANCE UR: CLEAR — SIGNIFICANT CHANGE UP
APTT BLD: 27.9 SEC — SIGNIFICANT CHANGE UP (ref 27–36.3)
AST SERPL-CCNC: 21 U/L — SIGNIFICANT CHANGE UP (ref 4–32)
BACTERIA # UR AUTO: NEGATIVE — SIGNIFICANT CHANGE UP
BASE EXCESS BLDV CALC-SCNC: 2.9 MMOL/L — SIGNIFICANT CHANGE UP (ref -2–3)
BASOPHILS # BLD AUTO: 0.11 K/UL — SIGNIFICANT CHANGE UP (ref 0–0.2)
BASOPHILS NFR BLD AUTO: 1.5 % — SIGNIFICANT CHANGE UP (ref 0–2)
BILIRUB SERPL-MCNC: 0.4 MG/DL — SIGNIFICANT CHANGE UP (ref 0.2–1.2)
BILIRUB UR-MCNC: NEGATIVE — SIGNIFICANT CHANGE UP
BLD GP AB SCN SERPL QL: NEGATIVE — SIGNIFICANT CHANGE UP
BLOOD GAS VENOUS COMPREHENSIVE RESULT: SIGNIFICANT CHANGE UP
BUN SERPL-MCNC: 16 MG/DL — SIGNIFICANT CHANGE UP (ref 7–23)
CALCIUM SERPL-MCNC: 9 MG/DL — SIGNIFICANT CHANGE UP (ref 8.4–10.5)
CHLORIDE BLDV-SCNC: 102 MMOL/L — SIGNIFICANT CHANGE UP (ref 96–108)
CHLORIDE SERPL-SCNC: 105 MMOL/L — SIGNIFICANT CHANGE UP (ref 98–107)
CO2 BLDV-SCNC: 30.5 MMOL/L — HIGH (ref 22–26)
CO2 SERPL-SCNC: 25 MMOL/L — SIGNIFICANT CHANGE UP (ref 22–31)
COLOR SPEC: SIGNIFICANT CHANGE UP
COMMENT - URINE: SIGNIFICANT CHANGE UP
CREAT SERPL-MCNC: 0.62 MG/DL — SIGNIFICANT CHANGE UP (ref 0.5–1.3)
DIFF PNL FLD: NEGATIVE — SIGNIFICANT CHANGE UP
EGFR: 98 ML/MIN/1.73M2 — SIGNIFICANT CHANGE UP
EOSINOPHIL # BLD AUTO: 0.27 K/UL — SIGNIFICANT CHANGE UP (ref 0–0.5)
EOSINOPHIL NFR BLD AUTO: 3.6 % — SIGNIFICANT CHANGE UP (ref 0–6)
EPI CELLS # UR: 3 /HPF — SIGNIFICANT CHANGE UP (ref 0–5)
GAS PNL BLDV: 136 MMOL/L — SIGNIFICANT CHANGE UP (ref 136–145)
GLUCOSE BLDV-MCNC: 227 MG/DL — HIGH (ref 70–99)
GLUCOSE SERPL-MCNC: 220 MG/DL — HIGH (ref 70–99)
GLUCOSE UR QL: ABNORMAL
HCO3 BLDV-SCNC: 29 MMOL/L — SIGNIFICANT CHANGE UP (ref 22–29)
HCT VFR BLD CALC: 41.5 % — SIGNIFICANT CHANGE UP (ref 34.5–45)
HCT VFR BLDA CALC: 44 % — SIGNIFICANT CHANGE UP (ref 34.5–46.5)
HGB BLD CALC-MCNC: 14.5 G/DL — SIGNIFICANT CHANGE UP (ref 11.7–16.1)
HGB BLD-MCNC: 13.9 G/DL — SIGNIFICANT CHANGE UP (ref 11.5–15.5)
IANC: 4.04 K/UL — SIGNIFICANT CHANGE UP (ref 1.8–7.4)
IMM GRANULOCYTES NFR BLD AUTO: 0.3 % — SIGNIFICANT CHANGE UP (ref 0–0.9)
INR BLD: 0.97 RATIO — SIGNIFICANT CHANGE UP (ref 0.88–1.16)
KETONES UR-MCNC: ABNORMAL
LACTATE BLDV-MCNC: 1.8 MMOL/L — SIGNIFICANT CHANGE UP (ref 0.5–2)
LEUKOCYTE ESTERASE UR-ACNC: ABNORMAL
LIDOCAIN IGE QN: 40 U/L — SIGNIFICANT CHANGE UP (ref 7–60)
LYMPHOCYTES # BLD AUTO: 2.42 K/UL — SIGNIFICANT CHANGE UP (ref 1–3.3)
LYMPHOCYTES # BLD AUTO: 32 % — SIGNIFICANT CHANGE UP (ref 13–44)
MAGNESIUM SERPL-MCNC: 2 MG/DL — SIGNIFICANT CHANGE UP (ref 1.6–2.6)
MCHC RBC-ENTMCNC: 29.2 PG — SIGNIFICANT CHANGE UP (ref 27–34)
MCHC RBC-ENTMCNC: 33.5 GM/DL — SIGNIFICANT CHANGE UP (ref 32–36)
MCV RBC AUTO: 87.2 FL — SIGNIFICANT CHANGE UP (ref 80–100)
MONOCYTES # BLD AUTO: 0.71 K/UL — SIGNIFICANT CHANGE UP (ref 0–0.9)
MONOCYTES NFR BLD AUTO: 9.4 % — SIGNIFICANT CHANGE UP (ref 2–14)
NEUTROPHILS # BLD AUTO: 4.04 K/UL — SIGNIFICANT CHANGE UP (ref 1.8–7.4)
NEUTROPHILS NFR BLD AUTO: 53.2 % — SIGNIFICANT CHANGE UP (ref 43–77)
NITRITE UR-MCNC: NEGATIVE — SIGNIFICANT CHANGE UP
NRBC # BLD: 0 /100 WBCS — SIGNIFICANT CHANGE UP (ref 0–0)
NRBC # FLD: 0 K/UL — SIGNIFICANT CHANGE UP (ref 0–0)
PCO2 BLDV: 49 MMHG — SIGNIFICANT CHANGE UP (ref 39–52)
PH BLDV: 7.38 — SIGNIFICANT CHANGE UP (ref 7.32–7.43)
PH UR: 7 — SIGNIFICANT CHANGE UP (ref 5–8)
PHOSPHATE SERPL-MCNC: 3.4 MG/DL — SIGNIFICANT CHANGE UP (ref 2.5–4.5)
PLATELET # BLD AUTO: 260 K/UL — SIGNIFICANT CHANGE UP (ref 150–400)
PO2 BLDV: 28 MMHG — SIGNIFICANT CHANGE UP (ref 25–45)
POTASSIUM BLDV-SCNC: 4.4 MMOL/L — SIGNIFICANT CHANGE UP (ref 3.5–5.1)
POTASSIUM SERPL-MCNC: 4.7 MMOL/L — SIGNIFICANT CHANGE UP (ref 3.5–5.3)
POTASSIUM SERPL-SCNC: 4.7 MMOL/L — SIGNIFICANT CHANGE UP (ref 3.5–5.3)
PROT SERPL-MCNC: 6.4 G/DL — SIGNIFICANT CHANGE UP (ref 6–8.3)
PROT UR-MCNC: NEGATIVE — SIGNIFICANT CHANGE UP
PROTHROM AB SERPL-ACNC: 11.3 SEC — SIGNIFICANT CHANGE UP (ref 10.5–13.4)
RBC # BLD: 4.76 M/UL — SIGNIFICANT CHANGE UP (ref 3.8–5.2)
RBC # FLD: 12.7 % — SIGNIFICANT CHANGE UP (ref 10.3–14.5)
RBC CASTS # UR COMP ASSIST: 1 /HPF — SIGNIFICANT CHANGE UP (ref 0–4)
RH IG SCN BLD-IMP: POSITIVE — SIGNIFICANT CHANGE UP
SAO2 % BLDV: 41.4 % — LOW (ref 67–88)
SODIUM SERPL-SCNC: 140 MMOL/L — SIGNIFICANT CHANGE UP (ref 135–145)
SP GR SPEC: 1.02 — SIGNIFICANT CHANGE UP (ref 1.01–1.05)
TROPONIN T, HIGH SENSITIVITY RESULT: 6 NG/L — SIGNIFICANT CHANGE UP
TROPONIN T, HIGH SENSITIVITY RESULT: 7 NG/L — SIGNIFICANT CHANGE UP
UROBILINOGEN FLD QL: SIGNIFICANT CHANGE UP
WBC # BLD: 7.57 K/UL — SIGNIFICANT CHANGE UP (ref 3.8–10.5)
WBC # FLD AUTO: 7.57 K/UL — SIGNIFICANT CHANGE UP (ref 3.8–10.5)
WBC UR QL: SIGNIFICANT CHANGE UP /HPF (ref 0–5)

## 2023-06-10 PROCEDURE — 99285 EMERGENCY DEPT VISIT HI MDM: CPT

## 2023-06-10 PROCEDURE — 71045 X-RAY EXAM CHEST 1 VIEW: CPT | Mod: 26

## 2023-06-10 PROCEDURE — 93010 ELECTROCARDIOGRAM REPORT: CPT

## 2023-06-10 RX ORDER — METOCLOPRAMIDE HCL 10 MG
1 TABLET ORAL
Qty: 28 | Refills: 0
Start: 2023-06-10 | End: 2023-06-16

## 2023-06-10 RX ORDER — SUCRALFATE 1 G
1 TABLET ORAL
Qty: 28 | Refills: 0
Start: 2023-06-10 | End: 2023-06-16

## 2023-06-10 RX ORDER — PANTOPRAZOLE SODIUM 20 MG/1
1 TABLET, DELAYED RELEASE ORAL
Qty: 30 | Refills: 0
Start: 2023-06-10 | End: 2023-07-09

## 2023-06-10 RX ORDER — FAMOTIDINE 10 MG/ML
20 INJECTION INTRAVENOUS ONCE
Refills: 0 | Status: COMPLETED | OUTPATIENT
Start: 2023-06-10 | End: 2023-06-10

## 2023-06-10 RX ORDER — METOCLOPRAMIDE HCL 10 MG
10 TABLET ORAL ONCE
Refills: 0 | Status: COMPLETED | OUTPATIENT
Start: 2023-06-10 | End: 2023-06-10

## 2023-06-10 RX ADMIN — Medication 30 MILLILITER(S): at 17:42

## 2023-06-10 RX ADMIN — FAMOTIDINE 20 MILLIGRAM(S): 10 INJECTION INTRAVENOUS at 17:42

## 2023-06-10 RX ADMIN — Medication 10 MILLIGRAM(S): at 17:42

## 2023-06-10 NOTE — ED ADULT NURSE NOTE - OBJECTIVE STATEMENT
pt aox4, primarily Ordu speaking, son Anish at bedside for translation at patient request. pt woke up today with onset of chest discomfort/tightness that has improved since. pt also complains of 1 month of left arm numbness that suddenly got worse after waking up from her nap today along with the resolved chest pain. BE FAST negative on exam. pt speaking clearly. breathing even and unlabored. skin intact. right ac20g inserted using aseptic technique with + blood return and flushed well. labs drawn per MD request.

## 2023-06-10 NOTE — ED PROVIDER NOTE - ATTENDING CONTRIBUTION TO CARE
The patient is a 68y Female who has a past medical and surgery history of DM c/b neuropathy/gastroparesis  HTN HLD Constipation Cholecystectomy PTED BIBEMS from home c/o of her "heart racing" since this AM preceded by left arm weakness for one month.    Vital Signs Last 24 Hrs  T(F): 98.4 HR: 62 BP: 149/64 RR: 18 SpO2: 98% (10 Omer 2023 17:03)   PE: as described; my additions and exceptions are noted in the chart    DATA:  EKG: pending at time of evaluation 12/8/21 Diagnosis Line Sinus bradycardia  T wave abnormality, consider anterior ischemia  Abnormal ECG  ECHO 12/7/21 69% stgI Diastolic Dysfx  LAB: Pending at time of evaluation      IMPRESSION/RISK:  Dx=  Differential includes but not limited to conditions listed in order of most possible first:   Consideration include  Plan

## 2023-06-10 NOTE — ED ADULT NURSE REASSESSMENT NOTE - NS ED NURSE REASSESS COMMENT FT1
pt reports symptom relief. son at bedside. dr. huber aware. snack and beverage given to patient, pt tolerated well. stable for d/c.

## 2023-06-10 NOTE — ED PROVIDER NOTE - PHYSICAL EXAMINATION
GENERAL: well appearing in no apparent distress  HEAD: normocephalic, atraumatic  HENT: airway intact  EYES: normal conjunctiva  CARDIAC: regular rate and rhythm, normal S1S2, no appreciable murmurs, 2+ pulses in UE/LE b/l  PULM: normal breath sounds, clear to ascultation bilaterally, no rales, rhonchi, wheezing  GI: abdomen nondistended, soft, nontender, no guarding, rebound tenderness  NEURO: no focal motor or sensory deficits, CN2-12 intact, normal speech, AAOx3  MSK: +ttp over left trapezius w/ + left spurling test  SKIN: well-perfused, extremities warm, no visible rashes

## 2023-06-10 NOTE — ED PROVIDER NOTE - CARE PLAN
1 Principal Discharge DX:	GERD (gastroesophageal reflux disease)  Secondary Diagnosis:	Left arm pain

## 2023-06-10 NOTE — ED PROVIDER NOTE - OBJECTIVE STATEMENT
67yo F w/ history of  gastroparesis , HTN, HLD, osteoporosis, IDDM and CAD s/p stents coming in w/ several complaints. Patient reports intermittent left upper extremity pain x 1 month; reports pain to be radiating from left shoulder down her arm w/ tingling sensation. Patient also reports intermittent epigastric abdominal pain that is worse when laying flat at night. She reports pain occasionally in the day time but no provoking symptoms. Patient has not reported symptoms to PCP. Denies any headache, changes in vision, weakness or difficulty ambulating

## 2023-06-10 NOTE — ED PROVIDER NOTE - EKG ADDITIONAL INFORMATION FREE TEXT

## 2023-06-10 NOTE — ED PROVIDER NOTE - NSFOLLOWUPINSTRUCTIONS_ED_ALL_ED_FT
The patient is a 68y Female who has a past medical and surgery history of DM c/b neuropathy/gastroparesis  HTN HLD Constipation Cholecystectomy PTED BIBEMS from home c/o of her "heart racing" since this AM preceded by left arm weakness for one month.    Vital Signs Last 24 Hrs  T(F): 98.4 HR: 62 BP: 149/64 RR: 18 SpO2: 98% (10 Omer 2023 17:03)   PE: as described; my additions and exceptions are noted in the chart    DATA:  EKG: pending at time of evaluation 12/8/21 Diagnosis Line Sinus bradycardia  T wave abnormality, consider anterior ischemia  Abnormal ECG  ECHO 12/7/21 69% stgI Diastolic Dysfx  LAB: Pending at time of evaluation      IMPRESSION/RISK:  Dx=  Differential includes but not limited to conditions listed in order of most possible first:   Consideration include  Plan Jean-Pierre Santos MD  Interventional Cardiology / Advance Heart Failure and Cardiac Transplant  Specialist  [place] Office : [address] [place] [address] [place] [address]  Tel:   [place] Office : [address] [facility] [place] [address]  Tel:  [phone]  Cell : [phone]     The patient is a 68y Female who has a past medical and surgery history of DM c/b neuropathy/gastroparesis  HTN HLD Constipation Cholecystectomy PTED BIBEMS from home c/o of her "heart racing" since this AM preceded by left arm weakness for one month.    Vital Signs Last 24 Hrs  T(F): 98.4 HR: 62 BP: 149/64 RR: 18 SpO2: 98% (10 Omer 2023 17:03)   PE: as described; my additions and exceptions are noted in the chart    DATA:  EKG: pending at time of evaluation 12/8/21 Diagnosis Line Sinus bradycardia  T wave abnormality, consider anterior ischemia  Abnormal ECG  ECHO 12/7/21 69% stgI Diastolic Dysfx  LAB: Pending at time of evaluation      IMPRESSION/RISK:  Dx=  Differential includes but not limited to conditions listed in order of most possible first:   Consideration include  Plan Followup with Jean-Pierre Santos MD  Interventional Cardiology / Advance Heart Failure and Cardiac Transplant  Specialist  [place] Office : [address] [place] [address] [place] [address]  Tel:   [place] Office : [address] [facility] [place] [address]  Tel:  [phone]  Cell : [phone]    Gastroparesis    WHAT YOU NEED TO KNOW:    Gastroparesis is a condition that causes food to move more slowly than normal from the stomach to the intestines. Gastroparesis is not caused by blockage. Often, the cause may not be known. It may be caused by damage to a nerve that controls muscles used to move food to your small intestines.    DISCHARGE INSTRUCTIONS:    You or someone else should call 911 if:    Your heart is beating faster and you are breathing faster than usual.    You cannot be woken up.  Return to the emergency department if:    You are confused or have trouble thinking clearly.    You are dizzy or very drowsy.  Contact your healthcare provider if:    You are urinating less than usual.    Your symptoms return or become worse.    The color of your urine is dark yellow.    You have questions or concerns about your condition or care.  Medicines:    Medicines may be given to control your nausea and vomiting. You may also receive medicines that help food move through your stomach at a more normal rate.    Take your medicine as directed. Contact your healthcare provider if you think your medicine is not helping or if you have side effects. Tell him or her if you are allergic to any medicine. Keep a list of the medicines, vitamins, and herbs you take. Include the amounts, and when and why you take them. Bring the list or the pill bottles to follow-up visits. Carry your medicine list with you in case of an emergency.  Follow up with your healthcare provider as directed: You may need tests to check if treatment is working. You may need to see a dietitian for help with a nutrition plan. Write down your questions so you remember to ask them during your visits.    Self-care: Your healthcare provider may suggest any of the following:    Change your eating habits. Take small bites of food to make it easier for your body to digest. You may need to eat several small meals low in fiber and fat throughout the day. Ask your dietitian for help with planning your meals.    Do not eat raw fruits, vegetables, or whole grains. These can cause you to have undigested food in your stomach. The undigested food can form a blockage that can become life-threatening.    Drink liquids as directed. Liquids will prevent dehydration caused by vomiting. Slowly drink small amounts of liquids at a time. Ask your healthcare provider how much liquid to drink each day, and which liquids are best for you. You may also need to drink an oral rehydration solution (ORS). An ORS has the right amounts of sugar, salt, and minerals in water to replace body fluids.    Do not lie down for 2 hours after your meals. Walking and sitting after meals help with digestion.    Control your blood sugar levels if you have diabetes. High blood sugar levels may make your symptoms worse. Ask your healthcare provider how to control your blood sugar levels.

## 2023-06-10 NOTE — ED PROVIDER NOTE - NSICDXPASTMEDICALHX_GEN_ALL_CORE_FT
PAST MEDICAL HISTORY:  Constipation     DM (diabetes mellitus)     High cholesterol     Hypertension

## 2023-06-10 NOTE — ED PROVIDER NOTE - PATIENT PORTAL LINK FT
You can access the FollowMyHealth Patient Portal offered by [facility] by registering at the following website: [email or web address]. By joining [employer]’s FollowMyHealth portal, you will also be able to view your health information using other applications (apps) compatible with our system.

## 2023-06-10 NOTE — ED PROVIDER NOTE - NS ED ROS FT
General: denies fever, chills  HENT: denies nasal congestion, rhinorrhea  Eyes: denies visual changes, blurred vision  CV: +chest pain  Resp: denies difficulty breathing, cough  Abdominal: denies nausea, vomiting, diarrhea, abdominal pain  : denies urinary pain or discharge  MSK: LUE pain/tingling  Neuro: denies headaches, numbness, tingling  Skin: denies rashes, bruises

## 2023-06-10 NOTE — ED ADULT TRIAGE NOTE - CHIEF COMPLAINT QUOTE
Patient arrives via EMS from home c/o "heart racing" since this AM and left arm weakness for one month. RR even and unlabored. Denies chest pain. PMHx: HTN, DM.

## 2023-06-10 NOTE — ED PROVIDER NOTE - CLINICAL SUMMARY MEDICAL DECISION MAKING FREE TEXT BOX
67yo F w/ history of  gastroparesis , HTN, HLD, osteoporosis, IDDM and CAD s/p stents coming in w/ several complaint including LUE pain/tingling that clinically appears to be secondary to cervical radiculopathy w/ no appreciable strength deficit. Chest pain may be secondary to gastroparesis but given cardiac history, will eval for atypical acs. Dispo pending results

## 2023-06-10 NOTE — ED PROVIDER NOTE - CARE PROVIDER_API CALL
Jean-Pierre Santos  Interventional Cardiology  [address] [place] [address]  Phone: [phone]  Fax: [phone]  Established Patient  Follow Up Time: 4-6 Days

## 2023-08-07 NOTE — DISCHARGE NOTE NURSING/CASE MANAGEMENT/SOCIAL WORK - NSDPDISTO_GEN_ALL_CORE
Please see message below.  Patient looking for rx refill for chronic cough.  Appropriate or appt needed?    (Patient has been prescribed prednisone in the past)   Home with home care

## 2023-09-13 ENCOUNTER — APPOINTMENT (OUTPATIENT)
Dept: CARDIOLOGY | Facility: CLINIC | Age: 68
End: 2023-09-13

## 2023-10-01 PROBLEM — M54.16 LUMBAR RADICULAR PAIN: Status: ACTIVE | Noted: 2017-04-25

## 2024-01-29 ENCOUNTER — OFFICE VISIT (OUTPATIENT)
Dept: URBAN - METROPOLITAN AREA CLINIC 74 | Facility: CLINIC | Age: 69
End: 2024-01-29
Payer: MEDICAID

## 2024-01-29 ENCOUNTER — LAB OUTSIDE AN ENCOUNTER (OUTPATIENT)
Dept: URBAN - METROPOLITAN AREA CLINIC 74 | Facility: CLINIC | Age: 69
End: 2024-01-29

## 2024-01-29 VITALS
WEIGHT: 134 LBS | OXYGEN SATURATION: 96 % | TEMPERATURE: 97.9 F | HEIGHT: 63 IN | SYSTOLIC BLOOD PRESSURE: 131 MMHG | BODY MASS INDEX: 23.74 KG/M2 | DIASTOLIC BLOOD PRESSURE: 80 MMHG | HEART RATE: 74 BPM

## 2024-01-29 DIAGNOSIS — R11.2 NAUSEA AND VOMITING IN ADULT: ICD-10-CM

## 2024-01-29 DIAGNOSIS — E11.65 TYPE 2 DIABETES MELLITUS WITH HYPERGLYCEMIA: ICD-10-CM

## 2024-01-29 DIAGNOSIS — K21.9 GERD WITHOUT ESOPHAGITIS: ICD-10-CM

## 2024-01-29 DIAGNOSIS — Z12.11 SCREENING FOR COLON CANCER: ICD-10-CM

## 2024-01-29 PROBLEM — 710815001: Status: ACTIVE | Noted: 2024-01-29

## 2024-01-29 PROBLEM — 266435005: Status: ACTIVE | Noted: 2024-01-29

## 2024-01-29 PROBLEM — 44054006: Status: ACTIVE | Noted: 2024-01-29

## 2024-01-29 PROCEDURE — 99204 OFFICE O/P NEW MOD 45 MIN: CPT | Performed by: INTERNAL MEDICINE

## 2024-01-29 RX ORDER — ONDANSETRON 4 MG/1
1 TABLET TABLET, FILM COATED ORAL THREE TIMES A DAY
Qty: 90 | Refills: 5

## 2024-01-29 RX ORDER — LACTULOSE 10 G/15ML
15 MILLILITER SOLUTION ORAL ONCE A DAY
Qty: 900 MILLILITER | Refills: 3

## 2024-01-29 RX ORDER — INSULIN GLARGINE 100 [IU]/ML
ADMINISTER 30 UNITS UNDER THE SKIN DAILY INJECTION, SOLUTION SUBCUTANEOUS
Qty: 27 MILLILITER | Refills: 0 | Status: ACTIVE | COMMUNITY

## 2024-01-29 RX ORDER — INSULIN LISPRO 100 [IU]/ML
INJECT 10 UNITS UNDER THE SKIN THREE TIMES DAILY INJECTION, SOLUTION INTRAVENOUS; SUBCUTANEOUS
Qty: 36 MILLILITER | Refills: 0 | Status: ACTIVE | COMMUNITY

## 2024-01-29 RX ORDER — ASPIRIN 81 MG/1
CHEW AND SWALLOW 1 TABLET BY MOUTH DAILY TABLET, CHEWABLE ORAL
Qty: 90 EACH | Refills: 0 | Status: ACTIVE | COMMUNITY

## 2024-01-29 RX ORDER — LACTULOSE 10 G/15ML
TAKE 15 ML BY MOUTH EVERY NIGHT SOLUTION ORAL
Qty: 1350 MILLILITER | Refills: 0 | Status: ACTIVE | COMMUNITY

## 2024-01-29 RX ORDER — CARVEDILOL 6.25 MG/1
TAKE 1 TABLET BY MOUTH TWICE DAILY TABLET, FILM COATED ORAL
Qty: 180 EACH | Refills: 0 | Status: ACTIVE | COMMUNITY

## 2024-01-29 RX ORDER — LOSARTAN POTASSIUM 50 MG/1
TAKE 1 TABLET BY MOUTH TWICE DAILY TABLET, FILM COATED ORAL
Qty: 180 EACH | Refills: 0 | Status: DISCONTINUED | COMMUNITY

## 2024-01-29 RX ORDER — SODIUM FLUORIDE 6.1 MG/ML
APPLY TWICE DAILY AS NEEDED PASTE, DENTIFRICE DENTAL
Qty: 100 MILLILITER | Refills: 0 | Status: ACTIVE | COMMUNITY

## 2024-01-29 RX ORDER — OMEPRAZOLE 40 MG/1
1 CAPSULE 30 MINUTES BEFORE MORNING MEAL CAPSULE, DELAYED RELEASE ORAL ONCE A DAY
Qty: 90 | Refills: 1 | OUTPATIENT
Start: 2024-01-29

## 2024-01-29 RX ORDER — GABAPENTIN 300 MG/1
1 CAPSULE CAPSULE ORAL ONCE A DAY
Status: ACTIVE | COMMUNITY

## 2024-01-29 RX ORDER — LOSARTAN POTASSIUM AND HYDROCHLOROTHIAZIDE 100; 12.5 MG/1; MG/1
1 TABLET TABLET, FILM COATED ORAL ONCE A DAY
Status: ACTIVE | COMMUNITY

## 2024-01-29 RX ORDER — ATORVASTATIN CALCIUM 40 MG/1
TAKE 1 TABLET BY MOUTH DAILY TABLET ORAL
Qty: 90 EACH | Refills: 0 | Status: ACTIVE | COMMUNITY

## 2024-01-29 NOTE — PHYSICAL EXAM GASTROINTESTINAL
Abdomen , soft, TTP in epigastric region, nondistended , no guarding or rigidity , no masses palpable , normal bowel sounds , Liver and Spleen , no hepatomegaly present , no hepatosplenomegaly , liver nontender , spleen not palpable

## 2024-01-29 NOTE — HPI-TODAY'S VISIT:
68-year-old  female with longstanding GI issues.  Has longstanding reflux with nausea/ vomiting.  She was told she probably has gastroparesis, but she has never had a gastric emptying study done in the past.  She had coronary artery disease and had a stent placed 2 years ago.  Since then has not had any cardiac issues.  Does not have a cardiologist in [place] yet.  Has chronic constipation and responds well to lactulose.  Advised to take it every day. she feels better if she has better bowel movements.  Has poorly controlled diabetes.  He recent hemoglobin A1c is 8.8.  Sugars run from 100-200.  Will be seeing an endocrinologist soon. she has recently moved to [place].  uses Pepcid as needed. stopped PPI as she has osteoporosis

## 2024-01-31 ENCOUNTER — WEB ENCOUNTER (OUTPATIENT)
Dept: URBAN - METROPOLITAN AREA CLINIC 74 | Facility: CLINIC | Age: 69
End: 2024-01-31

## 2024-01-31 RX ORDER — ONDANSETRON 4 MG/1
1 TABLET TABLET, FILM COATED ORAL TWICE DAILY
Qty: 60 | Refills: 5

## 2024-03-08 ENCOUNTER — EGD (OUTPATIENT)
Dept: URBAN - METROPOLITAN AREA SURGERY CENTER 30 | Facility: SURGERY CENTER | Age: 69
End: 2024-03-08

## 2024-04-08 ENCOUNTER — OV EP (OUTPATIENT)
Dept: URBAN - METROPOLITAN AREA CLINIC 74 | Facility: CLINIC | Age: 69
End: 2024-04-08

## 2024-05-07 ENCOUNTER — TELEPHONE ENCOUNTER (OUTPATIENT)
Dept: URBAN - METROPOLITAN AREA CLINIC 74 | Facility: CLINIC | Age: 69
End: 2024-05-07

## 2024-06-10 ENCOUNTER — OFFICE VISIT (OUTPATIENT)
Dept: URBAN - METROPOLITAN AREA SURGERY CENTER 30 | Facility: SURGERY CENTER | Age: 69
End: 2024-06-10

## 2024-06-26 ENCOUNTER — OFFICE VISIT (OUTPATIENT)
Dept: URBAN - METROPOLITAN AREA SURGERY CENTER 30 | Facility: SURGERY CENTER | Age: 69
End: 2024-06-26

## 2024-07-01 ENCOUNTER — TELEPHONE ENCOUNTER (OUTPATIENT)
Dept: URBAN - METROPOLITAN AREA CLINIC 74 | Facility: CLINIC | Age: 69
End: 2024-07-01

## 2024-07-02 ENCOUNTER — TELEPHONE ENCOUNTER (OUTPATIENT)
Dept: URBAN - METROPOLITAN AREA CLINIC 74 | Facility: CLINIC | Age: 69
End: 2024-07-02

## 2024-07-02 RX ORDER — ONDANSETRON 4 MG/1
1 TABLET TABLET, FILM COATED ORAL TWICE DAILY
Qty: 60 | Refills: 5

## 2024-07-03 ENCOUNTER — CLAIMS CREATED FROM THE CLAIM WINDOW (OUTPATIENT)
Dept: URBAN - METROPOLITAN AREA SURGERY CENTER 30 | Facility: SURGERY CENTER | Age: 69
End: 2024-07-03
Payer: MEDICARE

## 2024-07-03 ENCOUNTER — TELEPHONE ENCOUNTER (OUTPATIENT)
Dept: URBAN - METROPOLITAN AREA CLINIC 40 | Facility: CLINIC | Age: 69
End: 2024-07-03

## 2024-07-03 ENCOUNTER — CLAIMS CREATED FROM THE CLAIM WINDOW (OUTPATIENT)
Dept: URBAN - METROPOLITAN AREA CLINIC 4 | Facility: CLINIC | Age: 69
End: 2024-07-03
Payer: MEDICARE

## 2024-07-03 DIAGNOSIS — Z12.11 COLON CANCER SCREENING: ICD-10-CM

## 2024-07-03 DIAGNOSIS — K20.80 ABSCESS OF ESOPHAGUS: ICD-10-CM

## 2024-07-03 DIAGNOSIS — K29.60 ADENOPAPILLOMATOSIS GASTRICA: ICD-10-CM

## 2024-07-03 DIAGNOSIS — K21.00 ALKALINE REFLUX ESOPHAGITIS: ICD-10-CM

## 2024-07-03 DIAGNOSIS — R11.2 ACUTE NAUSEA WITH NONBILIOUS VOMITING: ICD-10-CM

## 2024-07-03 DIAGNOSIS — D12.3 ADENOMA OF TRANSVERSE COLON: ICD-10-CM

## 2024-07-03 DIAGNOSIS — D12.4 ADENOMA OF DESCENDING COLON: ICD-10-CM

## 2024-07-03 DIAGNOSIS — D12.4 BENIGN NEOPLASM OF DESCENDING COLON: ICD-10-CM

## 2024-07-03 DIAGNOSIS — K63.5 BENIGN COLON POLYP: ICD-10-CM

## 2024-07-03 DIAGNOSIS — K63.89 APPENDICITIS EPIPLOICA: ICD-10-CM

## 2024-07-03 DIAGNOSIS — D12.3 BENIGN NEOPLASM OF TRANSVERSE COLON: ICD-10-CM

## 2024-07-03 DIAGNOSIS — K63.89 OTHER SPECIFIED DISEASES OF INTESTINE: ICD-10-CM

## 2024-07-03 DIAGNOSIS — K29.80 ACUTE DUODENITIS: ICD-10-CM

## 2024-07-03 DIAGNOSIS — K29.70 CHRONIC ACITVE GASTRITIS (H.PYLORI NEGATIVE): ICD-10-CM

## 2024-07-03 DIAGNOSIS — K29.70 GASTRITIS, UNSPECIFIED, WITHOUT BLEEDING: ICD-10-CM

## 2024-07-03 DIAGNOSIS — R12 BURNING REFLUX: ICD-10-CM

## 2024-07-03 DIAGNOSIS — E11.9 ABNORMAL METABOLIC STATE DUE TO DIABETES MELLITUS: ICD-10-CM

## 2024-07-03 PROCEDURE — 00813 ANES UPR LWR GI NDSC PX: CPT | Performed by: NURSE ANESTHETIST, CERTIFIED REGISTERED

## 2024-07-03 PROCEDURE — 45385 COLONOSCOPY W/LESION REMOVAL: CPT | Performed by: CLINIC/CENTER

## 2024-07-03 PROCEDURE — 43239 EGD BIOPSY SINGLE/MULTIPLE: CPT | Performed by: CLINIC/CENTER

## 2024-07-03 PROCEDURE — 88342 IMHCHEM/IMCYTCHM 1ST ANTB: CPT | Performed by: PATHOLOGY

## 2024-07-03 PROCEDURE — 45385 COLONOSCOPY W/LESION REMOVAL: CPT | Performed by: INTERNAL MEDICINE

## 2024-07-03 PROCEDURE — 88305 TISSUE EXAM BY PATHOLOGIST: CPT | Performed by: PATHOLOGY

## 2024-07-03 PROCEDURE — 43239 EGD BIOPSY SINGLE/MULTIPLE: CPT | Performed by: INTERNAL MEDICINE

## 2024-07-03 PROCEDURE — 45380 COLONOSCOPY AND BIOPSY: CPT | Performed by: CLINIC/CENTER

## 2024-07-03 PROCEDURE — 45380 COLONOSCOPY AND BIOPSY: CPT | Performed by: INTERNAL MEDICINE

## 2024-07-03 RX ORDER — FAMOTIDINE 40 MG/1
1 TABLET AT BEDTIME TABLET, FILM COATED ORAL TWICE A DAY
Qty: 60 TABLET | Refills: 5 | Status: ACTIVE | COMMUNITY
Start: 2024-02-13

## 2024-07-03 RX ORDER — OMEPRAZOLE 40 MG/1
1 CAPSULE 30 MINUTES BEFORE MORNING MEAL CAPSULE, DELAYED RELEASE ORAL ONCE A DAY
Qty: 90 | Refills: 1 | Status: ACTIVE | COMMUNITY
Start: 2024-01-29

## 2024-07-03 RX ORDER — LACTULOSE 10 G/15ML
15 MILLILITER SOLUTION ORAL ONCE A DAY
Qty: 900 MILLILITER | Refills: 3 | Status: ACTIVE | COMMUNITY

## 2024-07-03 RX ORDER — SODIUM FLUORIDE 6.1 MG/ML
APPLY TWICE DAILY AS NEEDED PASTE, DENTIFRICE DENTAL
Qty: 100 MILLILITER | Refills: 0 | Status: ACTIVE | COMMUNITY

## 2024-07-03 RX ORDER — ATORVASTATIN CALCIUM 40 MG/1
TAKE 1 TABLET BY MOUTH DAILY TABLET ORAL
Qty: 90 EACH | Refills: 0 | Status: ACTIVE | COMMUNITY

## 2024-07-03 RX ORDER — INSULIN LISPRO 100 [IU]/ML
INJECT 10 UNITS UNDER THE SKIN THREE TIMES DAILY INJECTION, SOLUTION INTRAVENOUS; SUBCUTANEOUS
Qty: 36 MILLILITER | Refills: 0 | Status: ACTIVE | COMMUNITY

## 2024-07-03 RX ORDER — ASPIRIN 81 MG/1
CHEW AND SWALLOW 1 TABLET BY MOUTH DAILY TABLET, CHEWABLE ORAL
Qty: 90 EACH | Refills: 0 | Status: ACTIVE | COMMUNITY

## 2024-07-03 RX ORDER — PANTOPRAZOLE SODIUM 40 MG/1
1 TABLET TABLET, DELAYED RELEASE ORAL ONCE A DAY
Qty: 90 | Refills: 3 | OUTPATIENT
Start: 2024-07-03

## 2024-07-03 RX ORDER — GABAPENTIN 300 MG/1
1 CAPSULE CAPSULE ORAL ONCE A DAY
Status: ACTIVE | COMMUNITY

## 2024-07-03 RX ORDER — LOSARTAN POTASSIUM AND HYDROCHLOROTHIAZIDE 100; 12.5 MG/1; MG/1
1 TABLET TABLET, FILM COATED ORAL ONCE A DAY
Status: ACTIVE | COMMUNITY

## 2024-07-03 RX ORDER — INSULIN GLARGINE 100 [IU]/ML
ADMINISTER 30 UNITS UNDER THE SKIN DAILY INJECTION, SOLUTION SUBCUTANEOUS
Qty: 27 MILLILITER | Refills: 0 | Status: ACTIVE | COMMUNITY

## 2024-07-03 RX ORDER — CARVEDILOL 6.25 MG/1
TAKE 1 TABLET BY MOUTH TWICE DAILY TABLET, FILM COATED ORAL
Qty: 180 EACH | Refills: 0 | Status: ACTIVE | COMMUNITY

## 2024-07-03 RX ORDER — ONDANSETRON 4 MG/1
1 TABLET TABLET, FILM COATED ORAL TWICE DAILY
Qty: 60 | Refills: 5 | Status: ACTIVE | COMMUNITY

## 2024-07-08 ENCOUNTER — OFFICE VISIT (OUTPATIENT)
Dept: URBAN - METROPOLITAN AREA CLINIC 74 | Facility: CLINIC | Age: 69
End: 2024-07-08

## 2024-07-17 ENCOUNTER — OFFICE VISIT (OUTPATIENT)
Dept: URBAN - METROPOLITAN AREA CLINIC 74 | Facility: CLINIC | Age: 69
End: 2024-07-17

## 2024-07-22 ENCOUNTER — OFFICE VISIT (OUTPATIENT)
Dept: URBAN - METROPOLITAN AREA CLINIC 74 | Facility: CLINIC | Age: 69
End: 2024-07-22

## 2024-07-27 ENCOUNTER — DASHBOARD ENCOUNTERS (OUTPATIENT)
Age: 69
End: 2024-07-27

## 2024-07-27 PROBLEM — 196735001: Status: ACTIVE | Noted: 2024-07-27

## 2024-07-27 PROBLEM — 428283002: Status: ACTIVE | Noted: 2024-07-27

## 2024-07-27 PROBLEM — 72007001: Status: ACTIVE | Noted: 2024-07-27

## 2024-07-27 PROBLEM — 397881000: Status: ACTIVE | Noted: 2024-07-27

## 2024-07-30 ENCOUNTER — TELEPHONE ENCOUNTER (OUTPATIENT)
Dept: URBAN - METROPOLITAN AREA CLINIC 74 | Facility: CLINIC | Age: 69
End: 2024-07-30

## 2024-08-01 ENCOUNTER — OFFICE VISIT (OUTPATIENT)
Dept: URBAN - METROPOLITAN AREA CLINIC 74 | Facility: CLINIC | Age: 69
End: 2024-08-01

## 2024-08-01 RX ORDER — LOSARTAN POTASSIUM AND HYDROCHLOROTHIAZIDE 100; 12.5 MG/1; MG/1
1 TABLET TABLET, FILM COATED ORAL ONCE A DAY
COMMUNITY

## 2024-08-01 RX ORDER — LACTULOSE 10 G/15ML
15 MILLILITER SOLUTION ORAL ONCE A DAY
Qty: 900 MILLILITER | Refills: 3 | COMMUNITY

## 2024-08-01 RX ORDER — INSULIN GLARGINE 100 [IU]/ML
ADMINISTER 30 UNITS UNDER THE SKIN DAILY INJECTION, SOLUTION SUBCUTANEOUS
Qty: 27 MILLILITER | Refills: 0 | COMMUNITY

## 2024-08-01 RX ORDER — OMEPRAZOLE 40 MG/1
1 CAPSULE 30 MINUTES BEFORE MORNING MEAL CAPSULE, DELAYED RELEASE ORAL ONCE A DAY
Qty: 90 | Refills: 1 | COMMUNITY
Start: 2024-01-29

## 2024-08-01 RX ORDER — GABAPENTIN 300 MG/1
1 CAPSULE CAPSULE ORAL ONCE A DAY
COMMUNITY

## 2024-08-01 RX ORDER — ATORVASTATIN CALCIUM 40 MG/1
TAKE 1 TABLET BY MOUTH DAILY TABLET ORAL
Qty: 90 EACH | Refills: 0 | COMMUNITY

## 2024-08-01 RX ORDER — ONDANSETRON 4 MG/1
1 TABLET TABLET, FILM COATED ORAL TWICE DAILY
Qty: 60 | Refills: 5 | COMMUNITY

## 2024-08-01 RX ORDER — FAMOTIDINE 40 MG/1
1 TABLET AT BEDTIME TABLET, FILM COATED ORAL TWICE A DAY
Qty: 60 TABLET | Refills: 5 | COMMUNITY
Start: 2024-02-13

## 2024-08-01 RX ORDER — PANTOPRAZOLE SODIUM 40 MG/1
1 TABLET TABLET, DELAYED RELEASE ORAL ONCE A DAY
Qty: 90 | Refills: 3 | COMMUNITY
Start: 2024-07-03

## 2024-08-01 RX ORDER — ASPIRIN 81 MG/1
CHEW AND SWALLOW 1 TABLET BY MOUTH DAILY TABLET, CHEWABLE ORAL
Qty: 90 EACH | Refills: 0 | COMMUNITY

## 2024-08-01 RX ORDER — SODIUM FLUORIDE 6.1 MG/ML
APPLY TWICE DAILY AS NEEDED PASTE, DENTIFRICE DENTAL
Qty: 100 MILLILITER | Refills: 0 | COMMUNITY

## 2024-08-01 RX ORDER — CARVEDILOL 6.25 MG/1
TAKE 1 TABLET BY MOUTH TWICE DAILY TABLET, FILM COATED ORAL
Qty: 180 EACH | Refills: 0 | COMMUNITY

## 2024-08-01 RX ORDER — INSULIN LISPRO 100 [IU]/ML
INJECT 10 UNITS UNDER THE SKIN THREE TIMES DAILY INJECTION, SOLUTION INTRAVENOUS; SUBCUTANEOUS
Qty: 36 MILLILITER | Refills: 0 | COMMUNITY

## 2024-08-01 NOTE — HPI-TODAY'S VISIT:
The patient is 69-year-old female with past medical history as noted below known to Dr. Clemons is presenting to our clinic today to discuss her recent EGD and colonoscopy results.  Procedures: -- EGD with biopsy on 07/03/2024 by Dr. Clemons noted LA grade A reflux esophagitis with no bleeding.  Chronic gastritis.  Duodenitis.  Biopsy stomach with mild chronic gastritis.  No H.pylori organism or intestinal metaplasia.  -- Colonoscopy polypectomy on 07/03/2024 by Dr. Clemons noted perianal skin tags found on perianal exam. Six 5 to 10 mm polyps in the transverse colon, removed with a cold snare.  Resected and retrieved. One 5 mm polyp in the ascending colon, removed with a cold biopsy forceps resected and retrieved. One 6 mm polyp in the descending colon, removed with a cold snare.  Resected and retrieved.  Diverticulosis in sigmoid colon, in the descending colon, in the transverse colon.  Non-bleeding internal hemorrhoids.  Repeat colonoscopy in 1 year for surveillance.  Biopsy with benign colonic mucosa and tubular adenoma colon polyps.  No dysplasia or malignancy.

## 2025-01-28 ENCOUNTER — OFFICE VISIT (OUTPATIENT)
Dept: URBAN - METROPOLITAN AREA CLINIC 74 | Facility: CLINIC | Age: 70
End: 2025-01-28

## 2025-02-03 ENCOUNTER — OFFICE VISIT (OUTPATIENT)
Dept: URBAN - METROPOLITAN AREA CLINIC 74 | Facility: CLINIC | Age: 70
End: 2025-02-03
Payer: MEDICARE

## 2025-02-03 VITALS
HEIGHT: 63 IN | SYSTOLIC BLOOD PRESSURE: 112 MMHG | TEMPERATURE: 97.9 F | HEART RATE: 82 BPM | WEIGHT: 126 LBS | DIASTOLIC BLOOD PRESSURE: 76 MMHG | OXYGEN SATURATION: 97 % | BODY MASS INDEX: 22.32 KG/M2

## 2025-02-03 DIAGNOSIS — Z86.0100 HISTORY OF COLON POLYPS: ICD-10-CM

## 2025-02-03 DIAGNOSIS — K29.30 CHRONIC SUPERFICIAL GASTRITIS WITHOUT BLEEDING: ICD-10-CM

## 2025-02-03 DIAGNOSIS — K20.90 ESOPHAGITIS: ICD-10-CM

## 2025-02-03 DIAGNOSIS — K64.8 INTERNAL HEMORRHOIDS: ICD-10-CM

## 2025-02-03 DIAGNOSIS — E11.65 TYPE 2 DIABETES MELLITUS WITH HYPERGLYCEMIA: ICD-10-CM

## 2025-02-03 DIAGNOSIS — K29.80 DUODENITIS: ICD-10-CM

## 2025-02-03 DIAGNOSIS — K21.00 GASTROESOPHAGEAL REFLUX DISEASE WITH ESOPHAGITIS WITHOUT HEMORRHAGE: ICD-10-CM

## 2025-02-03 DIAGNOSIS — K58.1 IRRITABLE BOWEL SYNDROME WITH CONSTIPATION: ICD-10-CM

## 2025-02-03 DIAGNOSIS — K57.90 DIVERTICULOSIS: ICD-10-CM

## 2025-02-03 DIAGNOSIS — Z79.4 LONG TERM (CURRENT) USE OF INSULIN: ICD-10-CM

## 2025-02-03 DIAGNOSIS — K59.04 CHRONIC IDIOPATHIC CONSTIPATION: ICD-10-CM

## 2025-02-03 PROBLEM — 266433003: Status: ACTIVE | Noted: 2025-02-03

## 2025-02-03 PROBLEM — 440630006: Status: ACTIVE | Noted: 2025-02-03

## 2025-02-03 PROBLEM — 82934008: Status: ACTIVE | Noted: 2025-02-03

## 2025-02-03 PROCEDURE — 99214 OFFICE O/P EST MOD 30 MIN: CPT | Performed by: INTERNAL MEDICINE

## 2025-02-03 RX ORDER — INSULIN ASPART INJECTION 100 [IU]/ML
AS DIRECTED INJECTION, SOLUTION SUBCUTANEOUS
Status: ACTIVE | COMMUNITY

## 2025-02-03 RX ORDER — TRAZODONE HYDROCHLORIDE 50 MG/1
1 TABLET AT BEDTIME AS NEEDED TABLET ORAL ONCE A DAY
Status: ACTIVE | COMMUNITY

## 2025-02-03 RX ORDER — FAMOTIDINE 40 MG/1
1 TABLET AT BEDTIME TABLET, FILM COATED ORAL TWICE A DAY
Qty: 60 TABLET | Refills: 5 | Status: ON HOLD | COMMUNITY
Start: 2024-02-13

## 2025-02-03 RX ORDER — CARVEDILOL 6.25 MG/1
TAKE 1 TABLET BY MOUTH TWICE DAILY TABLET, FILM COATED ORAL
Qty: 180 EACH | Refills: 0 | Status: ACTIVE | COMMUNITY

## 2025-02-03 RX ORDER — VONOPRAZAN FUMARATE 26.72 MG/1
1 TABLET TABLET ORAL DAILY
Qty: 30 TABLET | Refills: 3 | OUTPATIENT
Start: 2025-02-03 | End: 2025-06-03

## 2025-02-03 RX ORDER — INSULIN DEGLUDEC INJECTION 100 U/ML
AS DIRECTED INJECTION, SOLUTION SUBCUTANEOUS
Status: ACTIVE | COMMUNITY

## 2025-02-03 RX ORDER — ATORVASTATIN CALCIUM 40 MG/1
TAKE 1 TABLET BY MOUTH DAILY TABLET ORAL
Qty: 90 EACH | Refills: 0 | Status: ACTIVE | COMMUNITY

## 2025-02-03 RX ORDER — ONDANSETRON 4 MG/1
1 TABLET TABLET, FILM COATED ORAL TWICE DAILY
Qty: 60 | Refills: 5 | Status: ACTIVE | COMMUNITY

## 2025-02-03 RX ORDER — SODIUM FLUORIDE 6.1 MG/ML
APPLY TWICE DAILY AS NEEDED PASTE, DENTIFRICE DENTAL
Qty: 100 MILLILITER | Refills: 0 | Status: ACTIVE | COMMUNITY

## 2025-02-03 RX ORDER — LINACLOTIDE 145 UG/1
1 CAPSULE AT LEAST 30 MINUTES BEFORE THE FIRST MEAL OF THE DAY ON AN EMPTY STOMACH CAPSULE, GELATIN COATED ORAL ONCE A DAY
Qty: 90 | Refills: 3 | OUTPATIENT
Start: 2025-02-03 | End: 2026-01-29

## 2025-02-03 RX ORDER — PANTOPRAZOLE SODIUM 40 MG/1
1 TABLET TABLET, DELAYED RELEASE ORAL ONCE A DAY
Qty: 90 | Refills: 3 | Status: ON HOLD | COMMUNITY
Start: 2024-07-03

## 2025-02-03 RX ORDER — LOSARTAN POTASSIUM AND HYDROCHLOROTHIAZIDE 100; 12.5 MG/1; MG/1
1 TABLET TABLET, FILM COATED ORAL ONCE A DAY
Status: ACTIVE | COMMUNITY

## 2025-02-03 RX ORDER — ASPIRIN 81 MG/1
CHEW AND SWALLOW 1 TABLET BY MOUTH DAILY TABLET, CHEWABLE ORAL
Qty: 90 EACH | Refills: 0 | Status: ACTIVE | COMMUNITY

## 2025-02-03 RX ORDER — LACTULOSE 10 G/15ML
15 MILLILITER SOLUTION ORAL ONCE A DAY
Qty: 900 MILLILITER | Refills: 3 | Status: ACTIVE | COMMUNITY

## 2025-02-03 NOTE — HPI-TODAY'S VISIT:
Nigel Marcum is a 69-year-old female who has been dealing with diabetes and gastrointestinal issues. Recently, her diabetes medication, Ozempic, was reduced to 0.25 mg due to intolerable side effects at higher doses, including constipation. Despite this adjustment, she continues to experience significant gastrointestinal discomfort, including constipation and acid reflux. Her current acid reflux medications, including Omeprazole and Pantoprazole, have not been effective. The patient has a history of irritable bowel syndrome, which contributes to her bloating and constipation. She has been using Lactulose to manage her constipation, but its effectiveness has diminished.    Procedures: -- EGD with biopsy on 07/03/2024 noted LA grade A reflux esophagitis with no bleeding. Chronic gastritis. Duodenitis. Biopsy stomach with mild chronic gastritis. No H.pylori organism or intestinal metaplasia.  -- Colonoscopy polypectomy on 07/03/2024  noted perianal skin tags found on perianal exam. Six 5 to 10 mm polyps in the transverse colon, removed with a cold snare. Resected and retrieved. One 5 mm polyp in the ascending colon, removed with a cold biopsy forceps resected and retrieved. One 6 mm polyp in the descending colon, removed with a cold snare. Resected and retrieved. Diverticulosis in sigmoid colon, in the descending colon, in the transverse colon. Non-bleeding internal hemorrhoids. Repeat colonoscopy in 1 year for surveillance. Biopsy with benign colonic mucosa and tubular adenoma colon polyps. No dysplasia or malignancy.

## 2025-02-05 ENCOUNTER — TELEPHONE ENCOUNTER (OUTPATIENT)
Dept: URBAN - METROPOLITAN AREA CLINIC 74 | Facility: CLINIC | Age: 70
End: 2025-02-05

## 2025-02-05 RX ORDER — LUBIPROSTONE 24 UG/1
1 CAPSULE WITH FOOD AND WATER CAPSULE, GELATIN COATED ORAL TWICE A DAY
Qty: 60 | OUTPATIENT
Start: 2025-02-05 | End: 2025-03-07

## 2025-02-05 RX ORDER — VONOPRAZAN FUMARATE 26.72 MG/1
1 TABLET TABLET ORAL ONCE A DAY
Qty: 90 TABLET | Refills: 3 | OUTPATIENT
Start: 2025-02-05 | End: 2026-01-31

## 2025-02-24 ENCOUNTER — TELEPHONE ENCOUNTER (OUTPATIENT)
Dept: URBAN - METROPOLITAN AREA CLINIC 74 | Facility: CLINIC | Age: 70
End: 2025-02-24

## 2025-03-06 ENCOUNTER — TELEPHONE ENCOUNTER (OUTPATIENT)
Dept: URBAN - METROPOLITAN AREA CLINIC 74 | Facility: CLINIC | Age: 70
End: 2025-03-06

## 2025-03-06 RX ORDER — LINACLOTIDE 290 UG/1
1 CAPSULE AT LEAST 30 MINUTES BEFORE THE FIRST MEAL OF THE DAY ON AN EMPTY STOMACH CAPSULE, GELATIN COATED ORAL ONCE A DAY
Qty: 90 | Refills: 3
Start: 2025-02-03 | End: 2026-03-01

## 2025-03-09 ENCOUNTER — WEB ENCOUNTER (OUTPATIENT)
Dept: URBAN - METROPOLITAN AREA CLINIC 74 | Facility: CLINIC | Age: 70
End: 2025-03-09

## 2025-03-10 ENCOUNTER — TELEPHONE ENCOUNTER (OUTPATIENT)
Dept: URBAN - METROPOLITAN AREA CLINIC 74 | Facility: CLINIC | Age: 70
End: 2025-03-10

## 2025-03-10 ENCOUNTER — OFFICE VISIT (OUTPATIENT)
Dept: URBAN - METROPOLITAN AREA TELEHEALTH 2 | Facility: TELEHEALTH | Age: 70
End: 2025-03-10

## 2025-03-10 VITALS — BODY MASS INDEX: 22.32 KG/M2 | HEIGHT: 63 IN | WEIGHT: 126 LBS

## 2025-03-10 RX ORDER — PANTOPRAZOLE SODIUM 40 MG/1
1 TABLET TABLET, DELAYED RELEASE ORAL ONCE A DAY
Qty: 90 | Refills: 3 | Status: ON HOLD | COMMUNITY
Start: 2024-07-03

## 2025-03-10 RX ORDER — VONOPRAZAN FUMARATE 26.72 MG/1
1 TABLET TABLET ORAL ONCE A DAY
Qty: 90 TABLET | Refills: 3 | Status: ACTIVE | COMMUNITY
Start: 2025-02-05 | End: 2026-01-31

## 2025-03-10 RX ORDER — FAMOTIDINE 40 MG/1
1 TABLET AT BEDTIME TABLET, FILM COATED ORAL TWICE A DAY
Qty: 60 TABLET | Refills: 5 | Status: ON HOLD | COMMUNITY
Start: 2024-02-13

## 2025-03-10 RX ORDER — LOSARTAN POTASSIUM AND HYDROCHLOROTHIAZIDE 100; 12.5 MG/1; MG/1
1 TABLET TABLET, FILM COATED ORAL ONCE A DAY
Status: ACTIVE | COMMUNITY

## 2025-03-10 RX ORDER — LINACLOTIDE 290 UG/1
1 CAPSULE AT LEAST 30 MINUTES BEFORE THE FIRST MEAL OF THE DAY ON AN EMPTY STOMACH CAPSULE, GELATIN COATED ORAL ONCE A DAY
Qty: 90 | Refills: 3 | Status: ACTIVE | COMMUNITY
Start: 2025-02-03 | End: 2026-03-01

## 2025-03-10 RX ORDER — LACTULOSE 10 G/15ML
15 MILLILITER SOLUTION ORAL ONCE A DAY
Qty: 900 MILLILITER | Refills: 3 | Status: ACTIVE | COMMUNITY

## 2025-03-10 RX ORDER — ONDANSETRON 4 MG/1
1 TABLET TABLET, FILM COATED ORAL TWICE DAILY
Qty: 60 | Refills: 5 | Status: ACTIVE | COMMUNITY

## 2025-03-10 RX ORDER — ASPIRIN 81 MG/1
CHEW AND SWALLOW 1 TABLET BY MOUTH DAILY TABLET, CHEWABLE ORAL
Qty: 90 EACH | Refills: 0 | Status: ACTIVE | COMMUNITY

## 2025-03-10 RX ORDER — CARVEDILOL 6.25 MG/1
TAKE 1 TABLET BY MOUTH TWICE DAILY TABLET, FILM COATED ORAL
Qty: 180 EACH | Refills: 0 | Status: ACTIVE | COMMUNITY

## 2025-03-10 RX ORDER — ATORVASTATIN CALCIUM 40 MG/1
TAKE 1 TABLET BY MOUTH DAILY TABLET ORAL
Qty: 90 EACH | Refills: 0 | Status: ACTIVE | COMMUNITY

## 2025-03-10 RX ORDER — INSULIN DEGLUDEC INJECTION 100 U/ML
AS DIRECTED INJECTION, SOLUTION SUBCUTANEOUS
Status: ACTIVE | COMMUNITY

## 2025-03-10 RX ORDER — VONOPRAZAN FUMARATE 26.72 MG/1
1 TABLET TABLET ORAL DAILY
Qty: 30 TABLET | Refills: 3 | Status: ON HOLD | COMMUNITY
Start: 2025-02-03 | End: 2025-06-03

## 2025-03-10 RX ORDER — INSULIN ASPART INJECTION 100 [IU]/ML
AS DIRECTED INJECTION, SOLUTION SUBCUTANEOUS
Status: ACTIVE | COMMUNITY

## 2025-03-10 RX ORDER — TRAZODONE HYDROCHLORIDE 50 MG/1
1 TABLET AT BEDTIME AS NEEDED TABLET ORAL ONCE A DAY
Status: ACTIVE | COMMUNITY

## 2025-03-10 RX ORDER — SODIUM FLUORIDE 6.1 MG/ML
APPLY TWICE DAILY AS NEEDED PASTE, DENTIFRICE DENTAL
Qty: 100 MILLILITER | Refills: 0 | Status: ACTIVE | COMMUNITY

## 2025-03-25 ENCOUNTER — TELEPHONE ENCOUNTER (OUTPATIENT)
Dept: URBAN - METROPOLITAN AREA CLINIC 40 | Facility: CLINIC | Age: 70
End: 2025-03-25

## 2025-04-10 ENCOUNTER — OFFICE VISIT (OUTPATIENT)
Dept: URBAN - METROPOLITAN AREA TELEHEALTH 2 | Facility: TELEHEALTH | Age: 70
End: 2025-04-10
Payer: MEDICARE

## 2025-04-10 DIAGNOSIS — Z79.4 LONG TERM (CURRENT) USE OF INSULIN: ICD-10-CM

## 2025-04-10 DIAGNOSIS — E11.65 TYPE 2 DIABETES MELLITUS WITH HYPERGLYCEMIA: ICD-10-CM

## 2025-04-10 DIAGNOSIS — K57.90 DIVERTICULOSIS: ICD-10-CM

## 2025-04-10 DIAGNOSIS — K59.04 CHRONIC IDIOPATHIC CONSTIPATION: ICD-10-CM

## 2025-04-10 DIAGNOSIS — K29.80 DUODENITIS: ICD-10-CM

## 2025-04-10 DIAGNOSIS — K58.1 IRRITABLE BOWEL SYNDROME WITH CONSTIPATION: ICD-10-CM

## 2025-04-10 DIAGNOSIS — K64.8 INTERNAL HEMORRHOIDS: ICD-10-CM

## 2025-04-10 DIAGNOSIS — K29.30 CHRONIC SUPERFICIAL GASTRITIS WITHOUT BLEEDING: ICD-10-CM

## 2025-04-10 DIAGNOSIS — K21.00 ALKALINE REFLUX ESOPHAGITIS: ICD-10-CM

## 2025-04-10 DIAGNOSIS — Z86.0100 HISTORY OF COLON POLYPS: ICD-10-CM

## 2025-04-10 DIAGNOSIS — K20.90 ESOPHAGITIS: ICD-10-CM

## 2025-04-10 PROCEDURE — 99443 PHONE E/M BY PHYS 21-30 MIN: CPT | Performed by: INTERNAL MEDICINE

## 2025-04-10 PROCEDURE — 99213 OFFICE O/P EST LOW 20 MIN: CPT | Performed by: INTERNAL MEDICINE

## 2025-04-10 RX ORDER — VONOPRAZAN FUMARATE 26.72 MG/1
1 TABLET TABLET ORAL ONCE A DAY
Qty: 90 TABLET | Refills: 3 | Status: ON HOLD | COMMUNITY
Start: 2025-02-05 | End: 2026-01-31

## 2025-04-10 RX ORDER — LINACLOTIDE 290 UG/1
1 CAPSULE AT LEAST 30 MINUTES BEFORE THE FIRST MEAL OF THE DAY ON AN EMPTY STOMACH CAPSULE, GELATIN COATED ORAL ONCE A DAY
Qty: 90 | Refills: 3 | Status: ACTIVE | COMMUNITY
Start: 2025-02-03 | End: 2026-03-01

## 2025-04-10 RX ORDER — CARVEDILOL 6.25 MG/1
TAKE 1 TABLET BY MOUTH TWICE DAILY TABLET, FILM COATED ORAL
Qty: 180 EACH | Refills: 0 | Status: ACTIVE | COMMUNITY

## 2025-04-10 RX ORDER — ONDANSETRON 4 MG/1
1 TABLET TABLET, FILM COATED ORAL TWICE DAILY
Qty: 60 | Refills: 5 | Status: ACTIVE | COMMUNITY

## 2025-04-10 RX ORDER — ASPIRIN 81 MG/1
CHEW AND SWALLOW 1 TABLET BY MOUTH DAILY TABLET, CHEWABLE ORAL
Qty: 90 EACH | Refills: 0 | Status: ACTIVE | COMMUNITY

## 2025-04-10 RX ORDER — FAMOTIDINE 40 MG/1
1 TABLET TABLET, FILM COATED ORAL ONCE A DAY
Qty: 90 TABLET | Refills: 3 | OUTPATIENT
Start: 2025-04-10

## 2025-04-10 RX ORDER — INSULIN ASPART INJECTION 100 [IU]/ML
AS DIRECTED INJECTION, SOLUTION SUBCUTANEOUS
Status: ACTIVE | COMMUNITY

## 2025-04-10 RX ORDER — LINACLOTIDE 145 UG/1
1 CAPSULE AT LEAST 30 MINUTES BEFORE THE FIRST MEAL OF THE DAY ON AN EMPTY STOMACH CAPSULE, GELATIN COATED ORAL ONCE A DAY
Qty: 90 | Refills: 3 | OUTPATIENT
Start: 2025-04-10 | End: 2026-04-05

## 2025-04-10 RX ORDER — INSULIN DEGLUDEC INJECTION 100 U/ML
AS DIRECTED INJECTION, SOLUTION SUBCUTANEOUS
Status: ACTIVE | COMMUNITY

## 2025-04-10 RX ORDER — ATORVASTATIN CALCIUM 40 MG/1
TAKE 1 TABLET BY MOUTH DAILY TABLET ORAL
Qty: 90 EACH | Refills: 0 | Status: ACTIVE | COMMUNITY

## 2025-04-10 RX ORDER — LACTULOSE 10 G/15ML
15 MILLILITER SOLUTION ORAL ONCE A DAY
Qty: 900 MILLILITER | Refills: 3 | Status: ON HOLD | COMMUNITY

## 2025-04-10 RX ORDER — LOSARTAN POTASSIUM AND HYDROCHLOROTHIAZIDE 100; 12.5 MG/1; MG/1
1 TABLET TABLET, FILM COATED ORAL ONCE A DAY
Status: ACTIVE | COMMUNITY

## 2025-04-10 RX ORDER — SODIUM FLUORIDE 6.1 MG/ML
APPLY TWICE DAILY AS NEEDED PASTE, DENTIFRICE DENTAL
Qty: 100 MILLILITER | Refills: 0 | Status: ACTIVE | COMMUNITY

## 2025-04-10 RX ORDER — ONDANSETRON 4 MG/1
1 TABLET ON THE TONGUE AND ALLOW TO DISSOLVE TABLET, ORALLY DISINTEGRATING ORAL TWICE DAILY
Qty: 60 | Refills: 5 | OUTPATIENT
Start: 2025-04-10

## 2025-04-10 RX ORDER — TRAZODONE HYDROCHLORIDE 50 MG/1
1 TABLET AT BEDTIME AS NEEDED TABLET ORAL ONCE A DAY
Status: ACTIVE | COMMUNITY

## 2025-04-10 NOTE — HPI-TODAY'S VISIT:
Nigel Marcum is a 69-year-old female who has been dealing with diabetes and gastrointestinal issues. Recently, her diabetes medication, Ozempic, was reduced to 0.25 mg due to intolerable side effects at higher doses, including constipation. Despite this adjustment, she continues to experience significant gastrointestinal discomfort, including constipation and acid reflux. Better on LInzess. Her current acid reflux medications, including Omeprazole and Pantoprazole, have not been very effective. The patient has a history of irritable bowel syndrome, which contributes to her bloating and constipation. She has been using Lactulose to manage her constipation, but its effectiveness has diminished.    Procedures: -- EGD with biopsy on 07/03/2024 noted LA grade A reflux esophagitis with no bleeding. Chronic gastritis. Duodenitis. Biopsy stomach with mild chronic gastritis. No H.pylori organism or intestinal metaplasia.  -- Colonoscopy polypectomy on 07/03/2024  noted perianal skin tags found on perianal exam. Six 5 to 10 mm polyps in the transverse colon, removed with a cold snare. Resected and retrieved. One 5 mm polyp in the ascending colon, removed with a cold biopsy forceps resected and retrieved. One 6 mm polyp in the descending colon, removed with a cold snare. Resected and retrieved. Diverticulosis in sigmoid colon, in the descending colon, in the transverse colon. Non-bleeding internal hemorrhoids. Repeat colonoscopy in 1 year for surveillance. Biopsy with benign colonic mucosa and tubular adenoma colon polyps. No dysplasia or malignancy.

## 2025-04-25 ENCOUNTER — TELEPHONE ENCOUNTER (OUTPATIENT)
Dept: URBAN - METROPOLITAN AREA CLINIC 74 | Facility: CLINIC | Age: 70
End: 2025-04-25

## 2025-04-25 RX ORDER — LINACLOTIDE 145 UG/1
1 CAPSULE AT LEAST 30 MINUTES BEFORE THE FIRST MEAL OF THE DAY ON AN EMPTY STOMACH CAPSULE, GELATIN COATED ORAL ONCE A DAY
Qty: 90 | Refills: 3
Start: 2025-04-10 | End: 2026-04-20

## 2025-05-27 ENCOUNTER — TELEPHONE ENCOUNTER (OUTPATIENT)
Dept: URBAN - METROPOLITAN AREA CLINIC 40 | Facility: CLINIC | Age: 70
End: 2025-05-27

## 2025-05-27 ENCOUNTER — OFFICE VISIT (OUTPATIENT)
Dept: URBAN - METROPOLITAN AREA TELEHEALTH 2 | Facility: TELEHEALTH | Age: 70
End: 2025-05-27
Payer: MEDICARE

## 2025-05-27 DIAGNOSIS — K59.04 CHRONIC IDIOPATHIC CONSTIPATION: ICD-10-CM

## 2025-05-27 DIAGNOSIS — K29.80 DUODENITIS: ICD-10-CM

## 2025-05-27 DIAGNOSIS — K21.00 ALKALINE REFLUX ESOPHAGITIS: ICD-10-CM

## 2025-05-27 DIAGNOSIS — Z79.4 LONG TERM (CURRENT) USE OF INSULIN: ICD-10-CM

## 2025-05-27 DIAGNOSIS — K58.1 IRRITABLE BOWEL SYNDROME WITH CONSTIPATION: ICD-10-CM

## 2025-05-27 DIAGNOSIS — Z86.0101 PERSONAL HISTORY OF ADENOMATOUS AND SERRATED COLON POLYPS: ICD-10-CM

## 2025-05-27 DIAGNOSIS — K20.90 ESOPHAGITIS: ICD-10-CM

## 2025-05-27 DIAGNOSIS — E11.65 TYPE 2 DIABETES MELLITUS WITH HYPERGLYCEMIA: ICD-10-CM

## 2025-05-27 DIAGNOSIS — K29.30 CHRONIC SUPERFICIAL GASTRITIS WITHOUT BLEEDING: ICD-10-CM

## 2025-05-27 DIAGNOSIS — K64.8 INTERNAL HEMORRHOIDS: ICD-10-CM

## 2025-05-27 DIAGNOSIS — K57.90 DIVERTICULOSIS: ICD-10-CM

## 2025-05-27 PROCEDURE — 99214 OFFICE O/P EST MOD 30 MIN: CPT | Performed by: INTERNAL MEDICINE

## 2025-05-27 RX ORDER — CARVEDILOL 6.25 MG/1
TAKE 1 TABLET BY MOUTH TWICE DAILY TABLET, FILM COATED ORAL
Qty: 180 EACH | Refills: 0 | Status: ACTIVE | COMMUNITY

## 2025-05-27 RX ORDER — ONDANSETRON 4 MG/1
1 TABLET TABLET, FILM COATED ORAL TWICE DAILY
Qty: 60 | Refills: 5

## 2025-05-27 RX ORDER — INSULIN DEGLUDEC INJECTION 100 U/ML
AS DIRECTED INJECTION, SOLUTION SUBCUTANEOUS
Status: ACTIVE | COMMUNITY

## 2025-05-27 RX ORDER — LOSARTAN POTASSIUM AND HYDROCHLOROTHIAZIDE 100; 12.5 MG/1; MG/1
1 TABLET TABLET, FILM COATED ORAL ONCE A DAY
Status: ACTIVE | COMMUNITY

## 2025-05-27 RX ORDER — ATORVASTATIN CALCIUM 40 MG/1
TAKE 1 TABLET BY MOUTH DAILY TABLET ORAL
Qty: 90 EACH | Refills: 0 | Status: ACTIVE | COMMUNITY

## 2025-05-27 RX ORDER — FAMOTIDINE 40 MG/1
1 TABLET TABLET, FILM COATED ORAL ONCE A DAY
Qty: 90 TABLET | Refills: 3 | Status: ON HOLD | COMMUNITY
Start: 2025-04-10

## 2025-05-27 RX ORDER — LINACLOTIDE 145 UG/1
1 CAPSULE AT LEAST 30 MINUTES BEFORE THE FIRST MEAL OF THE DAY ON AN EMPTY STOMACH CAPSULE, GELATIN COATED ORAL ONCE A DAY
Qty: 90 | Refills: 3 | Status: ACTIVE | COMMUNITY
Start: 2025-04-10 | End: 2026-04-20

## 2025-05-27 RX ORDER — INSULIN ASPART INJECTION 100 [IU]/ML
AS DIRECTED INJECTION, SOLUTION SUBCUTANEOUS
Status: ACTIVE | COMMUNITY

## 2025-05-27 RX ORDER — SODIUM FLUORIDE 6.1 MG/ML
APPLY TWICE DAILY AS NEEDED PASTE, DENTIFRICE DENTAL
Qty: 100 MILLILITER | Refills: 0 | Status: ACTIVE | COMMUNITY

## 2025-05-27 RX ORDER — ONDANSETRON 4 MG/1
1 TABLET ON THE TONGUE AND ALLOW TO DISSOLVE TABLET, ORALLY DISINTEGRATING ORAL TWICE DAILY
Qty: 60 | Refills: 5 | Status: ACTIVE | COMMUNITY
Start: 2025-04-10

## 2025-05-27 RX ORDER — ONDANSETRON 4 MG/1
1 TABLET TABLET, FILM COATED ORAL TWICE DAILY
Qty: 60 | Refills: 5 | Status: ACTIVE | COMMUNITY

## 2025-05-27 RX ORDER — TRAZODONE HYDROCHLORIDE 50 MG/1
1 TABLET AT BEDTIME AS NEEDED TABLET ORAL ONCE A DAY
Status: ACTIVE | COMMUNITY

## 2025-05-27 RX ORDER — LINACLOTIDE 290 UG/1
1 CAPSULE AT LEAST 30 MINUTES BEFORE THE FIRST MEAL OF THE DAY ON AN EMPTY STOMACH CAPSULE, GELATIN COATED ORAL ONCE A DAY
Qty: 90 | Refills: 3 | OUTPATIENT
Start: 2025-05-26 | End: 2026-05-21

## 2025-05-27 RX ORDER — ASPIRIN 81 MG/1
CHEW AND SWALLOW 1 TABLET BY MOUTH DAILY TABLET, CHEWABLE ORAL
Qty: 90 EACH | Refills: 0 | Status: ACTIVE | COMMUNITY

## 2025-05-27 NOTE — HPI-TODAY'S VISIT:
Nigel Marcum is a 70-year-old female who has been dealing with diabetes and gastrointestinal issues. Recently, her diabetes medication, Ozempic, was reduced to 0.25 mg due to intolerable side effects at higher doses, including constipation. Despite this adjustment, she continues to experience significant gastrointestinal discomfort, including constipation and acid reflux. Better on LInzess 145 mcg. was started on JAnuvia and that made issues worse. now using Linzess 290 mcg daily. Her current acid reflux medications, including Omeprazole and Pantoprazole, have not been very effective. did not tolerate famotidine. advised to use tums at night The patient has a history of irritable bowel syndrome, which contributes to her bloating and constipation. stopped lactulose as it did not help Procedures: -- EGD with biopsy on 07/03/2024 noted LA grade A reflux esophagitis with no bleeding. Chronic gastritis. Duodenitis. Biopsy stomach with mild chronic gastritis. No H.pylori organism or intestinal metaplasia.  -- Colonoscopy polypectomy on 07/03/2024  noted perianal skin tags found on perianal exam. Six 5 to 10 mm polyps in the transverse colon, removed with a cold snare. Resected and retrieved. One 5 mm polyp in the ascending colon, removed with a cold biopsy forceps resected and retrieved. One 6 mm polyp in the descending colon, removed with a cold snare. Resected and retrieved. Diverticulosis in sigmoid colon, in the descending colon, in the transverse colon. Non-bleeding internal hemorrhoids. Repeat colonoscopy in 1 year for surveillance. Biopsy with benign colonic mucosa and tubular adenoma colon polyps. No dysplasia or malignancy.  c/o fecal soiling/mild fluid leakage for past few years

## 2025-06-10 ENCOUNTER — TELEPHONE ENCOUNTER (OUTPATIENT)
Dept: URBAN - METROPOLITAN AREA CLINIC 74 | Facility: CLINIC | Age: 70
End: 2025-06-10

## 2025-06-27 ENCOUNTER — TELEPHONE ENCOUNTER (OUTPATIENT)
Dept: URBAN - METROPOLITAN AREA CLINIC 74 | Facility: CLINIC | Age: 70
End: 2025-06-27

## 2025-07-08 ENCOUNTER — OFFICE VISIT (OUTPATIENT)
Dept: URBAN - METROPOLITAN AREA CLINIC 40 | Facility: CLINIC | Age: 70
End: 2025-07-08

## 2025-07-25 ENCOUNTER — TELEPHONE ENCOUNTER (OUTPATIENT)
Dept: URBAN - METROPOLITAN AREA CLINIC 74 | Facility: CLINIC | Age: 70
End: 2025-07-25

## 2025-07-29 ENCOUNTER — LAB OUTSIDE AN ENCOUNTER (OUTPATIENT)
Dept: URBAN - METROPOLITAN AREA CLINIC 74 | Facility: CLINIC | Age: 70
End: 2025-07-29

## 2025-07-29 ENCOUNTER — OFFICE VISIT (OUTPATIENT)
Dept: URBAN - METROPOLITAN AREA CLINIC 74 | Facility: CLINIC | Age: 70
End: 2025-07-29

## 2025-07-29 RX ORDER — ONDANSETRON 4 MG/1
1 TABLET TABLET, FILM COATED ORAL TWICE DAILY
Qty: 60 | Refills: 5
Start: 2025-07-29

## 2025-07-29 RX ORDER — ONDANSETRON 4 MG/1
1 TABLET ON THE TONGUE AND ALLOW TO DISSOLVE TABLET, ORALLY DISINTEGRATING ORAL TWICE DAILY
Qty: 60 | Refills: 5 | Status: ACTIVE | COMMUNITY
Start: 2025-04-10

## 2025-07-29 RX ORDER — SODIUM FLUORIDE 6.1 MG/ML
APPLY TWICE DAILY AS NEEDED PASTE, DENTIFRICE DENTAL
Qty: 100 MILLILITER | Refills: 0 | Status: ACTIVE | COMMUNITY

## 2025-07-29 RX ORDER — LINACLOTIDE 290 UG/1
1 CAPSULE AT LEAST 30 MINUTES BEFORE THE FIRST MEAL OF THE DAY ON AN EMPTY STOMACH CAPSULE, GELATIN COATED ORAL ONCE A DAY
Qty: 90 | Refills: 3 | Status: ACTIVE | COMMUNITY
Start: 2025-05-26 | End: 2026-05-21

## 2025-07-29 RX ORDER — INSULIN ASPART INJECTION 100 [IU]/ML
AS DIRECTED INJECTION, SOLUTION SUBCUTANEOUS
Status: ACTIVE | COMMUNITY

## 2025-07-29 RX ORDER — ASPIRIN 81 MG/1
CHEW AND SWALLOW 1 TABLET BY MOUTH DAILY TABLET, CHEWABLE ORAL
Qty: 90 EACH | Refills: 0 | Status: ACTIVE | COMMUNITY

## 2025-07-29 RX ORDER — TRAZODONE HYDROCHLORIDE 50 MG/1
1 TABLET AT BEDTIME AS NEEDED TABLET ORAL ONCE A DAY
Status: ACTIVE | COMMUNITY

## 2025-07-29 RX ORDER — ONDANSETRON 4 MG/1
1 TABLET TABLET, FILM COATED ORAL TWICE DAILY
Qty: 60 | Refills: 5 | Status: ACTIVE | COMMUNITY

## 2025-07-29 RX ORDER — INSULIN DEGLUDEC INJECTION 100 U/ML
AS DIRECTED INJECTION, SOLUTION SUBCUTANEOUS
Status: ACTIVE | COMMUNITY

## 2025-07-29 RX ORDER — LOSARTAN POTASSIUM AND HYDROCHLOROTHIAZIDE 100; 12.5 MG/1; MG/1
1 TABLET TABLET, FILM COATED ORAL ONCE A DAY
Status: ACTIVE | COMMUNITY

## 2025-07-29 RX ORDER — ATORVASTATIN CALCIUM 40 MG/1
TAKE 1 TABLET BY MOUTH DAILY TABLET ORAL
Qty: 90 EACH | Refills: 0 | Status: ACTIVE | COMMUNITY

## 2025-07-29 RX ORDER — LINACLOTIDE 290 UG/1
1 CAPSULE AT LEAST 30 MINUTES BEFORE THE FIRST MEAL OF THE DAY ON AN EMPTY STOMACH CAPSULE, GELATIN COATED ORAL ONCE A DAY
Qty: 90 | Refills: 3 | OUTPATIENT
Start: 2025-07-29 | End: 2026-07-24

## 2025-07-29 RX ORDER — CARVEDILOL 6.25 MG/1
TAKE 1 TABLET BY MOUTH TWICE DAILY TABLET, FILM COATED ORAL
Qty: 180 EACH | Refills: 0 | Status: ACTIVE | COMMUNITY

## 2025-08-01 ENCOUNTER — LAB OUTSIDE AN ENCOUNTER (OUTPATIENT)
Dept: URBAN - METROPOLITAN AREA CLINIC 74 | Facility: CLINIC | Age: 70
End: 2025-08-01

## 2025-08-18 ENCOUNTER — OFFICE VISIT (OUTPATIENT)
Dept: URBAN - METROPOLITAN AREA CLINIC 74 | Facility: CLINIC | Age: 70
End: 2025-08-18

## 2025-08-18 RX ORDER — TRAZODONE HYDROCHLORIDE 50 MG/1
1 TABLET AT BEDTIME AS NEEDED TABLET ORAL ONCE A DAY
Status: ACTIVE | COMMUNITY

## 2025-08-18 RX ORDER — SODIUM FLUORIDE 6.1 MG/ML
APPLY TWICE DAILY AS NEEDED PASTE, DENTIFRICE DENTAL
Qty: 100 MILLILITER | Refills: 0 | Status: ACTIVE | COMMUNITY

## 2025-08-18 RX ORDER — CARVEDILOL 6.25 MG/1
TAKE 1 TABLET BY MOUTH TWICE DAILY TABLET, FILM COATED ORAL
Qty: 180 EACH | Refills: 0 | Status: ACTIVE | COMMUNITY

## 2025-08-18 RX ORDER — ONDANSETRON 4 MG/1
1 TABLET ON THE TONGUE AND ALLOW TO DISSOLVE TABLET, ORALLY DISINTEGRATING ORAL TWICE DAILY
Qty: 60 | Refills: 5 | Status: ACTIVE | COMMUNITY
Start: 2025-04-10

## 2025-08-18 RX ORDER — LOSARTAN POTASSIUM AND HYDROCHLOROTHIAZIDE 100; 12.5 MG/1; MG/1
1 TABLET TABLET, FILM COATED ORAL ONCE A DAY
Status: ACTIVE | COMMUNITY

## 2025-08-18 RX ORDER — INSULIN DEGLUDEC INJECTION 100 U/ML
AS DIRECTED INJECTION, SOLUTION SUBCUTANEOUS
Status: ACTIVE | COMMUNITY

## 2025-08-18 RX ORDER — ONDANSETRON 4 MG/1
1 TABLET TABLET, FILM COATED ORAL TWICE DAILY
Qty: 60 | Refills: 5 | Status: ACTIVE | COMMUNITY
Start: 2025-07-29

## 2025-08-18 RX ORDER — INSULIN ASPART INJECTION 100 [IU]/ML
AS DIRECTED INJECTION, SOLUTION SUBCUTANEOUS
Status: ACTIVE | COMMUNITY

## 2025-08-18 RX ORDER — ASPIRIN 81 MG/1
CHEW AND SWALLOW 1 TABLET BY MOUTH DAILY TABLET, CHEWABLE ORAL
Qty: 90 EACH | Refills: 0 | Status: ACTIVE | COMMUNITY

## 2025-08-18 RX ORDER — LINACLOTIDE 290 UG/1
1 CAPSULE AT LEAST 30 MINUTES BEFORE THE FIRST MEAL OF THE DAY ON AN EMPTY STOMACH CAPSULE, GELATIN COATED ORAL ONCE A DAY
Qty: 90 | Refills: 3 | Status: ACTIVE | COMMUNITY
Start: 2025-07-29 | End: 2026-07-24

## 2025-08-18 RX ORDER — ATORVASTATIN CALCIUM 40 MG/1
TAKE 1 TABLET BY MOUTH DAILY TABLET ORAL
Qty: 90 EACH | Refills: 0 | Status: ACTIVE | COMMUNITY

## 2025-08-25 ENCOUNTER — OFFICE VISIT (OUTPATIENT)
Dept: URBAN - METROPOLITAN AREA CLINIC 74 | Facility: CLINIC | Age: 70
End: 2025-08-25